# Patient Record
Sex: MALE | Race: BLACK OR AFRICAN AMERICAN
[De-identification: names, ages, dates, MRNs, and addresses within clinical notes are randomized per-mention and may not be internally consistent; named-entity substitution may affect disease eponyms.]

---

## 2017-04-03 ENCOUNTER — HOSPITAL ENCOUNTER (INPATIENT)
Dept: HOSPITAL 4 - SED | Age: 37
LOS: 1 days | Discharge: LEFT BEFORE DISCHARGE | DRG: 770 | End: 2017-04-04
Payer: COMMERCIAL

## 2017-04-03 VITALS
BODY MASS INDEX: 38.36 KG/M2 | HEIGHT: 76 IN | WEIGHT: 315 LBS | BODY MASS INDEX: 38.36 KG/M2 | WEIGHT: 315 LBS | HEIGHT: 76 IN

## 2017-04-03 VITALS
TEMPERATURE: 98 F | HEART RATE: 137 BPM | SYSTOLIC BLOOD PRESSURE: 136 MMHG | OXYGEN SATURATION: 97 % | RESPIRATION RATE: 18 BRPM | DIASTOLIC BLOOD PRESSURE: 96 MMHG

## 2017-04-03 VITALS
SYSTOLIC BLOOD PRESSURE: 135 MMHG | RESPIRATION RATE: 19 BRPM | OXYGEN SATURATION: 95 % | DIASTOLIC BLOOD PRESSURE: 79 MMHG | HEART RATE: 79 BPM | TEMPERATURE: 97.6 F

## 2017-04-03 DIAGNOSIS — E03.9: ICD-10-CM

## 2017-04-03 DIAGNOSIS — E11.9: ICD-10-CM

## 2017-04-03 DIAGNOSIS — I11.9: ICD-10-CM

## 2017-04-03 DIAGNOSIS — F32.9: ICD-10-CM

## 2017-04-03 DIAGNOSIS — F10.129: Primary | ICD-10-CM

## 2017-04-03 DIAGNOSIS — E66.01: ICD-10-CM

## 2017-04-03 DIAGNOSIS — G92: ICD-10-CM

## 2017-04-03 DIAGNOSIS — F41.9: ICD-10-CM

## 2017-04-03 DIAGNOSIS — Z79.899: ICD-10-CM

## 2017-04-03 DIAGNOSIS — F10.10: ICD-10-CM

## 2017-04-03 LAB
ALBUMIN SERPL BCP-MCNC: 3.7 G/DL (ref 3.4–4.8)
ALT SERPL W P-5'-P-CCNC: 70 U/L (ref 12–78)
AMPHETAMINES UR QL SCN: NEGATIVE
ANION GAP SERPL CALCULATED.3IONS-SCNC: < 3 MMOL/L (ref 5–15)
APAP SERPL-MCNC: < 1 UG/ML (ref 1–30)
APPEARANCE UR: CLEAR
AST SERPL W P-5'-P-CCNC: 71 U/L (ref 10–37)
BACTERIA URNS QL MICRO: (no result) /HPF
BARBITURATES UR QL SCN: NEGATIVE
BASOPHILS # BLD AUTO: 0 K/UL (ref 0–0.2)
BASOPHILS NFR BLD AUTO: 0.5 % (ref 0–2)
BENZODIAZ UR QL SCN: NEGATIVE
BILIRUB SERPL-MCNC: 0.4 MG/DL (ref 0–1)
BILIRUB UR QL STRIP: NEGATIVE
BUN SERPL-MCNC: 12 MG/DL (ref 8–21)
BZE UR QL SCN: NEGATIVE
CALCIUM SERPL-MCNC: 8.1 MG/DL (ref 8.4–11)
CANNABINOIDS UR QL SCN: NEGATIVE
CHLORIDE SERPL-SCNC: 102 MMOL/L (ref 98–107)
COLOR UR: YELLOW
CREAT SERPL-MCNC: 1.09 MG/DL (ref 0.55–1.3)
EOSINOPHIL # BLD AUTO: 0 K/UL (ref 0–0.4)
EOSINOPHIL NFR BLD AUTO: 0 % (ref 0–4)
ERYTHROCYTE [DISTWIDTH] IN BLOOD BY AUTOMATED COUNT: 14.2 % (ref 9–15)
ETHANOL SERPL-MCNC: 419 MG/DL (ref ?–10)
GFR SERPL CREATININE-BSD FRML MDRD: 97 ML/MIN (ref 90–?)
GLUCOSE SERPL-MCNC: 132 MG/DL (ref 70–99)
GLUCOSE UR STRIP-MCNC: NEGATIVE MG/DL
HCT VFR BLD AUTO: 40.4 % (ref 36–54)
HGB BLD-MCNC: 13.5 G/DL (ref 14–18)
HGB UR QL STRIP: (no result)
INR PPP: 1 (ref 0.8–1.2)
KETONES UR STRIP-MCNC: NEGATIVE MG/DL
LEUKOCYTE ESTERASE UR QL STRIP: NEGATIVE
LYMPHOCYTES # BLD AUTO: 3.6 K/UL (ref 1–5.5)
LYMPHOCYTES NFR BLD AUTO: 46.8 % (ref 20.5–51.5)
MCH RBC QN AUTO: 26 PG (ref 27–31)
MCHC RBC AUTO-ENTMCNC: 33 % (ref 32–36)
MCV RBC AUTO: 79 FL (ref 79–98)
METHADONE UR-SCNC: NEGATIVE UMOL/L
METHAMPHET UR-SCNC: NEGATIVE UMOL/L
MONOCYTES # BLD MANUAL: 0.4 K/UL (ref 0–1)
MONOCYTES # BLD MANUAL: 4.8 % (ref 1.7–9.3)
NEUTROPHILS # BLD AUTO: 3.8 K/UL (ref 1.8–7.7)
NEUTROPHILS NFR BLD AUTO: 47.9 % (ref 40–70)
NITRITE UR QL STRIP: NEGATIVE
OPIATES UR QL SCN: NEGATIVE
OXYCODONE SERPL-MCNC: NEGATIVE NG/ML
PCP UR QL SCN: NEGATIVE
PH UR STRIP: 6 [PH] (ref 5–8)
PLATELET # BLD AUTO: 226 K/UL (ref 130–430)
POTASSIUM SERPL-SCNC: 3.5 MMOL/L (ref 3.5–5.1)
PROT SERPL-MCNC: 7.8 G/DL (ref 6.4–8.3)
PROT UR QL STRIP: (no result)
PROTHROMBIN TIME: 11.3 SECS (ref 9.5–12.5)
RBC # BLD AUTO: 5.14 MIL/UL (ref 4.2–6.2)
RBC #/AREA URNS HPF: (no result) /HPF (ref 0–3)
SALICYLATES SERPL-MCNC: 1 MG/DL (ref 3–30)
SODIUM SERPLBLD-SCNC: 139 MMOL/L (ref 136–145)
SP GR UR STRIP: 1.01 (ref 1–1.03)
TRICYCLICS UR-MCNC: NEGATIVE NG/ML
URINE PROPOXYPHENE SCREEN: NEGATIVE
UROBILINOGEN UR STRIP-MCNC: 0.2 MG/DL (ref 0.2–1)
WBC # BLD AUTO: 7.8 K/UL (ref 4.8–10.8)
WBC #/AREA URNS HPF: (no result) /HPF (ref 0–3)

## 2017-04-03 PROCEDURE — C9113 INJ PANTOPRAZOLE SODIUM, VIA: HCPCS

## 2017-04-03 PROCEDURE — G0481 DRUG TEST DEF 8-14 CLASSES: HCPCS

## 2017-04-03 PROCEDURE — A6209 FOAM DRSG <=16 SQ IN W/O BDR: HCPCS

## 2017-04-03 PROCEDURE — G0480 DRUG TEST DEF 1-7 CLASSES: HCPCS

## 2017-04-03 PROCEDURE — G0482 DRUG TEST DEF 15-21 CLASSES: HCPCS

## 2017-04-03 NOTE — NUR
ADMISSION NOTE

Received patient from ER via gurney. Patient admitted with diagnosis of Alcohol 
intoxication. Patient is awake, alert, oriented but sleepy.  Patient oriented to hospital 
room, call light, toileting, pain management and safety-teach back done. Patient informed 
that German will be his nurse and that their room number is 121C. Personal belongings checked 
and Belongings List documented. Call light within reach.

## 2017-04-03 NOTE — NUR
Patient will be admitted to care of Dr Morin.  Admitted to Tele  unit.  Will go 
to room 121C.  Belongings list completed.  Summary report printed. Report given 
to Admitting RN .

## 2017-04-03 NOTE — NUR
CONSULTATION PAGED



REASON FOR CONSULTATION:ELEVATED TROPONIN

WAS CONSULT CALLED?Y

PERSON WHO WAS NOTIFIED:GUS

CONSULTING PHYSICIAN:RAYNE SAAB

CONSULTANT SPECIALTY:CARDIO

CONSULTANT PHONE NUMBER:[phone]

## 2017-04-03 NOTE — NUR
Initial note

Received 37 y.o.  male from ER via [employer] for ALOC.  Awake, alert & oriented 
to name, place and year.  Denies pain or discomfort.  No respiratory distress noted.  able 
to ambulate with steady gait.   Banana bag infusing to left ac @ 250 cc/hr.  Instructed on 
plan of care & use of call light if in need of assistance.  Verbalized understanding.  Call 
light within reach, bed alarm on.

## 2017-04-03 NOTE — NUR
Arrived via BLS ambulance having consumed 2 4 locos drink and a bottle of 
voodka. Pt is poorly verbal and has blood around his mouth with a abraison to 
tongue. Patient to ER boucher 1 to [facility] for evaluation. Side rails up. Report 
given to Efe GIRALDO.

## 2017-04-04 VITALS
SYSTOLIC BLOOD PRESSURE: 132 MMHG | OXYGEN SATURATION: 96 % | RESPIRATION RATE: 20 BRPM | DIASTOLIC BLOOD PRESSURE: 91 MMHG | HEART RATE: 84 BPM | TEMPERATURE: 98.4 F

## 2017-04-04 VITALS
TEMPERATURE: 97.8 F | RESPIRATION RATE: 17 BRPM | SYSTOLIC BLOOD PRESSURE: 150 MMHG | OXYGEN SATURATION: 97 % | DIASTOLIC BLOOD PRESSURE: 96 MMHG | HEART RATE: 80 BPM

## 2017-04-04 VITALS
HEART RATE: 77 BPM | SYSTOLIC BLOOD PRESSURE: 154 MMHG | TEMPERATURE: 97.8 F | DIASTOLIC BLOOD PRESSURE: 95 MMHG | RESPIRATION RATE: 20 BRPM | OXYGEN SATURATION: 96 %

## 2017-04-04 VITALS — HEART RATE: 84 BPM | SYSTOLIC BLOOD PRESSURE: 132 MMHG | DIASTOLIC BLOOD PRESSURE: 91 MMHG

## 2017-04-04 RX ADMIN — ALBUTEROL SULFATE SCH MG: 2.5 SOLUTION RESPIRATORY (INHALATION) at 07:59

## 2017-04-04 RX ADMIN — ALBUTEROL SULFATE SCH MG: 2.5 SOLUTION RESPIRATORY (INHALATION) at 11:46

## 2017-04-04 NOTE — NUR
NOTE:

PATIENT IS RESTING IN CHAIR. NO S/S OF DISTRESS OR SOB. PATIENT STATED THAT HE NEEDED TO 
LEAVE FOR WORK. PAGED DR. RODAS AND INFORMED AND HE STATED THAT HE WOULD NOT DISCHARGE BUT 
PATIENT COULD LEAVE AMA IF WANTED. INFORMED PATIENT AND HE STATED THAT HE WOULD LEAVE AMA. 
PAPER WORK WAS SIGNED AND PATIENT IS WAITING FOR CLOTHES FROM SECURITY.

## 2017-04-04 NOTE — NUR
CANCELATION OF CONSULT COULD NOT BE REACHED.



TIRED TO CALL BACK DR. BARNES OFFICE TO CANCEL CONSULTATION. THERE WAS NO ANSWER. RN MAYDA 
AND CHARGE NURSE ALEXANDRU WAS NOTIFIED AND AWARE.

## 2017-04-04 NOTE — NUR
Closing note

Resting with eyes closed, easily aroused.  no agitation noted.  No c/o pain or discomfort.  
IV fluid infusing to left ac without difficulty.  All needs attended to.  Call light within 
reach, fall precautions in place.  Will give report to KRISTAL Boo via SBAR method.

## 2017-04-04 NOTE — NUR
CONSULTATION PAGED



REASON FOR CONSULTATION:ETOH ABUSE

WAS CONSULT CALLED?Y

PERSON WHO WAS NOTIFIED:LEONARDO

CONSULTING PHYSICIAN:,SAID

CONSULTANT SPECIALTY:PSYCH

CONSULTANT PHONE NUMBER:[phone]

## 2017-04-04 NOTE — NUR
Ambulation

Sitting up on side of bed.  Stated wanted to go to toilet.  Instructed to notify staff & use 
of call light if need to get up to assist.  Verbalized understanding.  Ambulated to toilet 
with steady gait, denies dizziness.  Ambulated back to bed with supervision.  Call light 
placed within reach, bed alarm on.

## 2017-04-04 NOTE — NUR
NOTE:

PATIENT IS RESTING COMFORTABLY IN BED. NO S/S OF DISTRESS OR SOB. PATIENT IS ALERT AND 
ORIENTED, ABLE TO EXPRESS NEEDS, AND ASK FOR ASSISTANCE. CALL LIGHT IN REACH, BED IN LOWEST 
POSITION, AND WILL CONTINUE TO MONITOR.

## 2017-04-04 NOTE — NUR
Opening Note:

Received report from night nurse. Patient is sleeping in bed. No s/s of distress or sob. 
Patient is alert and oriented. IV is patent and infusing. Call light in reach, bed in lowest 
position, and will continue to monitor.

## 2017-04-04 NOTE — NUR
CANCELATION OF CONSULT COULD NOT BE REACHED. 



TRIED TO CALL BACK DR. BARNES OFFICE TO CANCEL CONSULTATION. NO ONE PICKED UP. CALLED FOUR 
TIMES TO GET A HOLD OF 'S OFFICE. NO ANSWER. KRISTAL OHARA AND CHARGE NURSE ALEXANDRU WAS NOTIFIED 
AND AWARE. 

-------------------------------------------------------------------------------

Addendum: 04/04/17 at 1618 by Kayla Damico CNA

-------------------------------------------------------------------------------

CANCELATION OF CONSULT COULD NOT BE REACHED.



TIRED TO CALL BACK DR. BARNES OFFICE TO CANCEL CONSULTATION. THERE WAS NO ANSWER. KRISTAL OHARA 
AND CHARGE NURSE HORVATH WAS NOTIFIED AND AWARE.

## 2017-04-04 NOTE — NUR
AMA: 

Patient does not wish to proceed with medical care recommended by Dr. Mckeon.  Patient 
given information related to possible complications, up to and including death, which could 
occur as a result of leaving hospital at this time.  Patient verbalizes understanding of 
risks involved leaving against medical advice.  Patient has signed AMA form.

## 2017-04-04 NOTE — NUR
NOTE:

PATIENT IS RESTING COMFORTABLY IN BED. NO S/S OF DISTRESS OR SOB. PATIENT IS ALERT AND 
ORIENTED, ABLE TO EXPRESS NEEDS, AND ASK FOR ASSISTANCE. DR. RODAS IN TO SEE PATIENT. CALL 
LIGHT IN REACH, BED IN LOWEST POSITION, AND WILL CONTINUE TO MONITOR.

## 2017-07-17 ENCOUNTER — HOSPITAL ENCOUNTER (INPATIENT)
Dept: HOSPITAL 4 - SED | Age: 37
LOS: 4 days | Discharge: HOME | End: 2017-07-21
Attending: INTERNAL MEDICINE | Admitting: INTERNAL MEDICINE
Payer: MEDICAID

## 2017-07-17 VITALS — SYSTOLIC BLOOD PRESSURE: 145 MMHG

## 2017-07-17 VITALS — BODY MASS INDEX: 39.17 KG/M2 | HEIGHT: 75 IN | WEIGHT: 315 LBS

## 2017-07-17 DIAGNOSIS — Z79.899: ICD-10-CM

## 2017-07-17 DIAGNOSIS — Z82.49: ICD-10-CM

## 2017-07-17 DIAGNOSIS — F10.29: Primary | ICD-10-CM
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
## 2017-07-17 DIAGNOSIS — J44.9: ICD-10-CM

## 2017-07-17 DIAGNOSIS — F39: ICD-10-CM

## 2017-07-17 DIAGNOSIS — E03.9: ICD-10-CM

## 2017-07-17 DIAGNOSIS — F32.9: ICD-10-CM

## 2017-07-17 DIAGNOSIS — R45.851: ICD-10-CM

## 2017-07-17 DIAGNOSIS — Z83.3: ICD-10-CM

## 2017-07-17 DIAGNOSIS — I10: ICD-10-CM

## 2017-07-17 LAB
ALBUMIN SERPL BCP-MCNC: 3.3 G/DL (ref 3.4–4.8)
ALT SERPL W P-5'-P-CCNC: 134 U/L (ref 12–78)
AMPHETAMINES UR QL SCN: NEGATIVE
ANION GAP SERPL CALCULATED.3IONS-SCNC: 7 MMOL/L (ref 5–15)
APAP SERPL-MCNC: < 1 UG/ML (ref 1–30)
APPEARANCE UR: (no result)
AST SERPL W P-5'-P-CCNC: 104 U/L (ref 10–37)
BACTERIA URNS QL MICRO: (no result) /HPF
BARBITURATES UR QL SCN: NEGATIVE
BASOPHILS NFR BLD MANUAL: 0 % (ref 0–2)
BENZODIAZ UR QL SCN: POSITIVE
BILIRUB SERPL-MCNC: 0.4 MG/DL (ref 0–1)
BILIRUB UR QL STRIP: NEGATIVE
BUN SERPL-MCNC: 17 MG/DL (ref 8–21)
BZE UR QL SCN: NEGATIVE
CALCIUM SERPL-MCNC: 7.5 MG/DL (ref 8.4–11)
CANNABINOIDS UR QL SCN: NEGATIVE
CHLORIDE SERPL-SCNC: 106 MMOL/L (ref 98–107)
COLOR UR: YELLOW
CREAT SERPL-MCNC: 0.94 MG/DL (ref 0.55–1.3)
EOSINOPHIL NFR BLD MANUAL: 0 % (ref 0–7)
ERYTHROCYTE [DISTWIDTH] IN BLOOD BY AUTOMATED COUNT: 13.8 % (ref 9–15)
GFR SERPL CREATININE-BSD FRML MDRD: 115 ML/MIN (ref 90–?)
GLUCOSE SERPL-MCNC: 156 MG/DL (ref 70–99)
GLUCOSE UR STRIP-MCNC: NEGATIVE MG/DL
HCT VFR BLD AUTO: 39.4 % (ref 36–54)
HGB BLD-MCNC: 13.1 G/DL (ref 14–18)
HGB UR QL STRIP: NEGATIVE
KETONES UR STRIP-MCNC: NEGATIVE MG/DL
LEUKOCYTE ESTERASE UR QL STRIP: NEGATIVE
LYMPHOCYTES NFR BLD MANUAL: 52 % (ref 20–46)
MCH RBC QN AUTO: 27 PG (ref 27–31)
MCHC RBC AUTO-ENTMCNC: 33 % (ref 32–36)
MCV RBC AUTO: 80 FL (ref 79–98)
METHADONE UR-SCNC: NEGATIVE UMOL/L
METHAMPHET UR-SCNC: NEGATIVE UMOL/L
MONOCYTES # BLD MANUAL: 4 % (ref 0–11)
MUCOUS THREADS URNS QL MICRO: (no result) /LPF
NEUTS BAND NFR BLD MANUAL: 0 % (ref 0–6)
NITRITE UR QL STRIP: NEGATIVE
OPIATES UR QL SCN: NEGATIVE
OXYCODONE SERPL-MCNC: NEGATIVE NG/ML
PCP UR QL SCN: NEGATIVE
PH UR STRIP: 6 [PH] (ref 5–8)
PLATELET # BLD AUTO: 201 K/UL (ref 130–430)
POTASSIUM SERPL-SCNC: 3 MMOL/L (ref 3.5–5.1)
PROT SERPL-MCNC: 7.1 G/DL (ref 6.4–8.3)
PROT UR QL STRIP: (no result)
RBC # BLD AUTO: 4.93 MIL/UL (ref 4.2–6.2)
RBC #/AREA URNS HPF: (no result) /HPF (ref 0–3)
SALICYLATES SERPL-MCNC: 1 MG/DL (ref 3–30)
SODIUM SERPLBLD-SCNC: 144 MMOL/L (ref 136–145)
SP GR UR STRIP: 1.01 (ref 1–1.03)
TRICYCLICS UR-MCNC: NEGATIVE NG/ML
URINE PROPOXYPHENE SCREEN: NEGATIVE
UROBILINOGEN UR STRIP-MCNC: 1 MG/DL (ref 0.2–1)
VARIANT LYMPHS NFR BLD MANUAL: 0 % (ref 0–0)
WBC # BLD AUTO: 8 K/UL (ref 4.8–10.8)
WBC #/AREA URNS HPF: (no result) /HPF (ref 0–3)

## 2017-07-17 PROCEDURE — G0482 DRUG TEST DEF 15-21 CLASSES: HCPCS

## 2017-07-17 PROCEDURE — G0480 DRUG TEST DEF 1-7 CLASSES: HCPCS

## 2017-07-17 PROCEDURE — G0481 DRUG TEST DEF 8-14 CLASSES: HCPCS

## 2017-07-18 VITALS — SYSTOLIC BLOOD PRESSURE: 134 MMHG

## 2017-07-18 VITALS — SYSTOLIC BLOOD PRESSURE: 150 MMHG

## 2017-07-18 LAB
ANION GAP SERPL CALCULATED.3IONS-SCNC: < 3 MMOL/L (ref 5–15)
BASOPHILS # BLD AUTO: 0.1 K/UL (ref 0–0.2)
BASOPHILS NFR BLD AUTO: 0.9 % (ref 0–2)
BUN SERPL-MCNC: 21 MG/DL (ref 8–21)
CALCIUM SERPL-MCNC: 7.4 MG/DL (ref 8.4–11)
CHLORIDE SERPL-SCNC: 107 MMOL/L (ref 98–107)
CREAT SERPL-MCNC: 0.95 MG/DL (ref 0.55–1.3)
EOSINOPHIL # BLD AUTO: 0 K/UL (ref 0–0.4)
EOSINOPHIL NFR BLD AUTO: 0.4 % (ref 0–4)
ERYTHROCYTE [DISTWIDTH] IN BLOOD BY AUTOMATED COUNT: 13.7 % (ref 9–15)
GFR SERPL CREATININE-BSD FRML MDRD: 114 ML/MIN (ref 90–?)
GLUCOSE SERPL-MCNC: 99 MG/DL (ref 70–99)
HCT VFR BLD AUTO: 36 % (ref 36–54)
HGB BLD-MCNC: 11.8 G/DL (ref 14–18)
LYMPHOCYTES # BLD AUTO: 3.7 K/UL (ref 1–5.5)
LYMPHOCYTES NFR BLD AUTO: 42.3 % (ref 20.5–51.5)
MCH RBC QN AUTO: 26 PG (ref 27–31)
MCHC RBC AUTO-ENTMCNC: 33 % (ref 32–36)
MCV RBC AUTO: 79 FL (ref 79–98)
MONOCYTES # BLD MANUAL: 0.5 K/UL (ref 0–1)
MONOCYTES # BLD MANUAL: 6 % (ref 1.7–9.3)
NEUTROPHILS # BLD AUTO: 4.4 K/UL (ref 1.8–7.7)
NEUTROPHILS NFR BLD AUTO: 50.4 % (ref 40–70)
PLATELET # BLD AUTO: 183 K/UL (ref 130–430)
POTASSIUM SERPL-SCNC: 3.9 MMOL/L (ref 3.5–5.1)
RBC # BLD AUTO: 4.54 MIL/UL (ref 4.2–6.2)
SODIUM SERPLBLD-SCNC: 142 MMOL/L (ref 136–145)
WBC # BLD AUTO: 8.7 K/UL (ref 4.8–10.8)

## 2017-07-18 RX ADMIN — ZOLPIDEM TARTRATE PRN MG: 5 TABLET, FILM COATED ORAL at 22:28

## 2017-07-19 VITALS — SYSTOLIC BLOOD PRESSURE: 143 MMHG

## 2017-07-19 VITALS — SYSTOLIC BLOOD PRESSURE: 148 MMHG

## 2017-07-19 VITALS — SYSTOLIC BLOOD PRESSURE: 138 MMHG

## 2017-07-19 VITALS — SYSTOLIC BLOOD PRESSURE: 154 MMHG

## 2017-07-19 VITALS — SYSTOLIC BLOOD PRESSURE: 150 MMHG

## 2017-07-19 VITALS — SYSTOLIC BLOOD PRESSURE: 142 MMHG

## 2017-07-19 VITALS — SYSTOLIC BLOOD PRESSURE: 146 MMHG

## 2017-07-19 VITALS — SYSTOLIC BLOOD PRESSURE: 167 MMHG

## 2017-07-19 VITALS — SYSTOLIC BLOOD PRESSURE: 151 MMHG

## 2017-07-19 VITALS — SYSTOLIC BLOOD PRESSURE: 111 MMHG

## 2017-07-19 VITALS — SYSTOLIC BLOOD PRESSURE: 149 MMHG

## 2017-07-19 VITALS — SYSTOLIC BLOOD PRESSURE: 144 MMHG

## 2017-07-19 VITALS — SYSTOLIC BLOOD PRESSURE: 152 MMHG

## 2017-07-19 VITALS — SYSTOLIC BLOOD PRESSURE: 147 MMHG

## 2017-07-19 VITALS — SYSTOLIC BLOOD PRESSURE: 145 MMHG

## 2017-07-19 VITALS — SYSTOLIC BLOOD PRESSURE: 132 MMHG

## 2017-07-19 LAB
ALBUMIN SERPL BCP-MCNC: 2.8 G/DL (ref 3.4–4.8)
ALT SERPL W P-5'-P-CCNC: 107 U/L (ref 12–78)
ANION GAP SERPL CALCULATED.3IONS-SCNC: 5 MMOL/L (ref 5–15)
AST SERPL W P-5'-P-CCNC: 84 U/L (ref 10–37)
BILIRUB SERPL-MCNC: 0.7 MG/DL (ref 0–1)
BUN SERPL-MCNC: 21 MG/DL (ref 8–21)
CALCIUM SERPL-MCNC: 7.6 MG/DL (ref 8.4–11)
CHLORIDE SERPL-SCNC: 105 MMOL/L (ref 98–107)
CREAT SERPL-MCNC: 0.91 MG/DL (ref 0.55–1.3)
GFR SERPL CREATININE-BSD FRML MDRD: 119 ML/MIN (ref 90–?)
GLUCOSE SERPL-MCNC: 95 MG/DL (ref 70–99)
POTASSIUM SERPL-SCNC: 3.7 MMOL/L (ref 3.5–5.1)
PROT SERPL-MCNC: 6.5 G/DL (ref 6.4–8.3)
SODIUM SERPLBLD-SCNC: 140 MMOL/L (ref 136–145)
TSH SERPL DL<=0.05 MIU/L-ACNC: 3.39 UIU/ML (ref 0.34–4.82)

## 2017-07-19 RX ADMIN — GABAPENTIN SCH MG: 300 CAPSULE ORAL at 20:50

## 2017-07-19 RX ADMIN — GABAPENTIN SCH MG: 300 CAPSULE ORAL at 08:50

## 2017-07-19 RX ADMIN — OXCARBAZEPINE SCH MG: 150 TABLET, FILM COATED ORAL at 08:50

## 2017-07-19 RX ADMIN — ONDANSETRON PRN MG: 2 INJECTION INTRAMUSCULAR; INTRAVENOUS at 03:57

## 2017-07-19 RX ADMIN — OXCARBAZEPINE SCH MG: 150 TABLET, FILM COATED ORAL at 20:50

## 2017-07-19 RX ADMIN — Medication SCH MG: at 08:51

## 2017-07-19 RX ADMIN — ONDANSETRON PRN MG: 2 INJECTION INTRAMUSCULAR; INTRAVENOUS at 12:15

## 2017-07-19 RX ADMIN — PANTOPRAZOLE SODIUM SCH MG: 40 TABLET, DELAYED RELEASE ORAL at 08:50

## 2017-07-19 RX ADMIN — BENAZEPRIL HYDROCHLORIDE SCH MG: 20 TABLET ORAL at 08:51

## 2017-07-19 RX ADMIN — LEVOTHYROXINE SODIUM SCH MG: 50 TABLET ORAL at 08:50

## 2017-07-19 RX ADMIN — GABAPENTIN SCH MG: 300 CAPSULE ORAL at 15:25

## 2017-07-19 RX ADMIN — SERTRALINE HYDROCHLORIDE SCH MG: 50 TABLET, FILM COATED ORAL at 08:50

## 2017-07-19 RX ADMIN — METOPROLOL SUCCINATE SCH MG: 50 TABLET, EXTENDED RELEASE ORAL at 08:49

## 2017-07-19 RX ADMIN — SERTRALINE HYDROCHLORIDE SCH MG: 50 TABLET, FILM COATED ORAL at 20:50

## 2017-07-20 VITALS — SYSTOLIC BLOOD PRESSURE: 140 MMHG

## 2017-07-20 VITALS — SYSTOLIC BLOOD PRESSURE: 138 MMHG

## 2017-07-20 VITALS — SYSTOLIC BLOOD PRESSURE: 145 MMHG

## 2017-07-20 VITALS — SYSTOLIC BLOOD PRESSURE: 146 MMHG

## 2017-07-20 VITALS — SYSTOLIC BLOOD PRESSURE: 143 MMHG

## 2017-07-20 VITALS — SYSTOLIC BLOOD PRESSURE: 127 MMHG

## 2017-07-20 VITALS — SYSTOLIC BLOOD PRESSURE: 130 MMHG

## 2017-07-20 VITALS — SYSTOLIC BLOOD PRESSURE: 148 MMHG

## 2017-07-20 VITALS — SYSTOLIC BLOOD PRESSURE: 147 MMHG

## 2017-07-20 VITALS — SYSTOLIC BLOOD PRESSURE: 144 MMHG

## 2017-07-20 VITALS — SYSTOLIC BLOOD PRESSURE: 149 MMHG

## 2017-07-20 VITALS — SYSTOLIC BLOOD PRESSURE: 136 MMHG

## 2017-07-20 VITALS — SYSTOLIC BLOOD PRESSURE: 162 MMHG

## 2017-07-20 LAB
ANION GAP SERPL CALCULATED.3IONS-SCNC: 3 MMOL/L (ref 5–15)
BUN SERPL-MCNC: 13 MG/DL (ref 8–21)
CALCIUM SERPL-MCNC: 8 MG/DL (ref 8.4–11)
CHLORIDE SERPL-SCNC: 106 MMOL/L (ref 98–107)
CREAT SERPL-MCNC: 0.83 MG/DL (ref 0.55–1.3)
GFR SERPL CREATININE-BSD FRML MDRD: 133 ML/MIN (ref 90–?)
GLUCOSE SERPL-MCNC: 101 MG/DL (ref 70–99)
POTASSIUM SERPL-SCNC: 3.5 MMOL/L (ref 3.5–5.1)
SODIUM SERPLBLD-SCNC: 139 MMOL/L (ref 136–145)

## 2017-07-20 RX ADMIN — OXCARBAZEPINE ONE MG: 150 TABLET, FILM COATED ORAL at 14:11

## 2017-07-20 RX ADMIN — BENAZEPRIL HYDROCHLORIDE SCH MG: 20 TABLET ORAL at 09:02

## 2017-07-20 RX ADMIN — OXCARBAZEPINE SCH MG: 150 TABLET, FILM COATED ORAL at 11:08

## 2017-07-20 RX ADMIN — SERTRALINE HYDROCHLORIDE ONE MG: 50 TABLET, FILM COATED ORAL at 14:11

## 2017-07-20 RX ADMIN — LEVOTHYROXINE SODIUM SCH MG: 50 TABLET ORAL at 09:13

## 2017-07-20 RX ADMIN — ONDANSETRON PRN MG: 2 INJECTION INTRAMUSCULAR; INTRAVENOUS at 09:02

## 2017-07-20 RX ADMIN — Medication SCH MG: at 09:03

## 2017-07-20 RX ADMIN — PANTOPRAZOLE SODIUM SCH MG: 40 TABLET, DELAYED RELEASE ORAL at 09:02

## 2017-07-20 RX ADMIN — GABAPENTIN SCH MG: 300 CAPSULE ORAL at 14:11

## 2017-07-20 RX ADMIN — OXCARBAZEPINE SCH MG: 150 TABLET, FILM COATED ORAL at 11:10

## 2017-07-20 RX ADMIN — OXCARBAZEPINE ONE MG: 150 TABLET, FILM COATED ORAL at 11:04

## 2017-07-20 RX ADMIN — SERTRALINE HYDROCHLORIDE SCH MG: 50 TABLET, FILM COATED ORAL at 21:18

## 2017-07-20 RX ADMIN — SERTRALINE HYDROCHLORIDE ONE MG: 50 TABLET, FILM COATED ORAL at 11:04

## 2017-07-20 RX ADMIN — GABAPENTIN SCH MG: 300 CAPSULE ORAL at 21:18

## 2017-07-20 RX ADMIN — OXCARBAZEPINE SCH MG: 150 TABLET, FILM COATED ORAL at 21:18

## 2017-07-20 RX ADMIN — GABAPENTIN SCH MG: 300 CAPSULE ORAL at 09:04

## 2017-07-20 RX ADMIN — METOPROLOL SUCCINATE SCH MG: 50 TABLET, EXTENDED RELEASE ORAL at 09:03

## 2017-07-20 RX ADMIN — SERTRALINE HYDROCHLORIDE SCH MG: 50 TABLET, FILM COATED ORAL at 11:08

## 2017-07-20 RX ADMIN — ZOLPIDEM TARTRATE PRN MG: 5 TABLET, FILM COATED ORAL at 23:16

## 2017-07-21 VITALS — SYSTOLIC BLOOD PRESSURE: 144 MMHG

## 2017-07-21 VITALS — SYSTOLIC BLOOD PRESSURE: 149 MMHG

## 2017-07-21 VITALS — SYSTOLIC BLOOD PRESSURE: 128 MMHG

## 2017-07-21 VITALS — SYSTOLIC BLOOD PRESSURE: 146 MMHG

## 2017-07-21 VITALS — SYSTOLIC BLOOD PRESSURE: 145 MMHG

## 2017-07-21 VITALS — SYSTOLIC BLOOD PRESSURE: 139 MMHG

## 2017-07-21 VITALS — SYSTOLIC BLOOD PRESSURE: 147 MMHG

## 2017-07-21 VITALS — SYSTOLIC BLOOD PRESSURE: 137 MMHG

## 2017-07-21 VITALS — SYSTOLIC BLOOD PRESSURE: 120 MMHG

## 2017-07-21 VITALS — SYSTOLIC BLOOD PRESSURE: 159 MMHG

## 2017-07-21 RX ADMIN — OXCARBAZEPINE SCH MG: 150 TABLET, FILM COATED ORAL at 08:34

## 2017-07-21 RX ADMIN — Medication SCH MG: at 08:31

## 2017-07-21 RX ADMIN — SERTRALINE HYDROCHLORIDE SCH MG: 50 TABLET, FILM COATED ORAL at 08:33

## 2017-07-21 RX ADMIN — Medication SCH MG: at 08:33

## 2017-07-21 RX ADMIN — METOPROLOL SUCCINATE SCH MG: 50 TABLET, EXTENDED RELEASE ORAL at 08:32

## 2017-07-21 RX ADMIN — BENAZEPRIL HYDROCHLORIDE SCH MG: 20 TABLET ORAL at 08:32

## 2017-07-21 RX ADMIN — LEVOTHYROXINE SODIUM SCH MG: 50 TABLET ORAL at 08:33

## 2017-07-21 RX ADMIN — GABAPENTIN SCH MG: 300 CAPSULE ORAL at 08:33

## 2017-07-21 RX ADMIN — PANTOPRAZOLE SODIUM SCH MG: 40 TABLET, DELAYED RELEASE ORAL at 08:33

## 2017-10-12 ENCOUNTER — HOSPITAL ENCOUNTER (INPATIENT)
Dept: HOSPITAL 4 - SED | Age: 37
LOS: 2 days | Discharge: HOME | End: 2017-10-14
Payer: COMMERCIAL

## 2017-10-12 VITALS — BODY MASS INDEX: 38.36 KG/M2 | WEIGHT: 315 LBS | HEIGHT: 76 IN

## 2017-10-12 VITALS — SYSTOLIC BLOOD PRESSURE: 110 MMHG

## 2017-10-12 VITALS — SYSTOLIC BLOOD PRESSURE: 130 MMHG

## 2017-10-12 VITALS — SYSTOLIC BLOOD PRESSURE: 126 MMHG

## 2017-10-12 DIAGNOSIS — I42.2: ICD-10-CM

## 2017-10-12 DIAGNOSIS — Z79.899: ICD-10-CM

## 2017-10-12 DIAGNOSIS — G62.9: ICD-10-CM

## 2017-10-12 DIAGNOSIS — G92: ICD-10-CM

## 2017-10-12 DIAGNOSIS — G43.909: ICD-10-CM

## 2017-10-12 DIAGNOSIS — F10.229: Primary | ICD-10-CM

## 2017-10-12 DIAGNOSIS — I10: ICD-10-CM

## 2017-10-12 DIAGNOSIS — N17.0: ICD-10-CM

## 2017-10-12 LAB
ALBUMIN SERPL BCP-MCNC: 3.7 G/DL (ref 3.4–4.8)
ALT SERPL W P-5'-P-CCNC: 56 U/L (ref 12–78)
AMPHETAMINES UR QL SCN: NEGATIVE
AMYLASE SERPL-CCNC: 65 U/L (ref 0–100)
ANION GAP SERPL CALCULATED.3IONS-SCNC: 10 MMOL/L (ref 5–15)
APAP SERPL-MCNC: < 1 UG/ML (ref 1–30)
APPEARANCE UR: CLEAR
AST SERPL W P-5'-P-CCNC: 49 U/L (ref 10–37)
BACTERIA URNS QL MICRO: (no result) /HPF
BARBITURATES UR QL SCN: NEGATIVE
BASOPHILS # BLD AUTO: 0 K/UL (ref 0–0.2)
BASOPHILS NFR BLD AUTO: 0.4 % (ref 0–2)
BENZODIAZ UR QL SCN: POSITIVE
BILIRUB SERPL-MCNC: 0.3 MG/DL (ref 0–1)
BILIRUB UR QL STRIP: NEGATIVE
BUN SERPL-MCNC: 14 MG/DL (ref 8–21)
BZE UR QL SCN: NEGATIVE
CALCIUM SERPL-MCNC: 8.6 MG/DL (ref 8.4–11)
CANNABINOIDS UR QL SCN: NEGATIVE
CHLORIDE SERPL-SCNC: 105 MMOL/L (ref 98–107)
COLOR UR: YELLOW
CREAT SERPL-MCNC: 1.03 MG/DL (ref 0.55–1.3)
EOSINOPHIL # BLD AUTO: 0 K/UL (ref 0–0.4)
EOSINOPHIL NFR BLD AUTO: 0.1 % (ref 0–4)
ERYTHROCYTE [DISTWIDTH] IN BLOOD BY AUTOMATED COUNT: 14 % (ref 9–15)
ETHANOL SERPL-MCNC: 444 MG/DL (ref ?–10)
GFR SERPL CREATININE-BSD FRML MDRD: 103 ML/MIN (ref 90–?)
GLUCOSE SERPL-MCNC: 158 MG/DL (ref 70–99)
GLUCOSE UR STRIP-MCNC: NEGATIVE MG/DL
HCT VFR BLD AUTO: 45.5 % (ref 36–54)
HGB BLD-MCNC: 14.3 G/DL (ref 14–18)
HGB UR QL STRIP: (no result)
INR PPP: 1.1 (ref 0.8–1.2)
KETONES UR STRIP-MCNC: NEGATIVE MG/DL
LEUKOCYTE ESTERASE UR QL STRIP: NEGATIVE
LIPASE SERPL-CCNC: 59 U/L (ref 73–393)
LYMPHOCYTES # BLD AUTO: 2 K/UL (ref 1–5.5)
LYMPHOCYTES NFR BLD AUTO: 36.9 % (ref 20.5–51.5)
MCH RBC QN AUTO: 25 PG (ref 27–31)
MCHC RBC AUTO-ENTMCNC: 31 % (ref 32–36)
MCV RBC AUTO: 80 FL (ref 79–98)
METHADONE UR-SCNC: NEGATIVE UMOL/L
METHAMPHET UR-SCNC: NEGATIVE UMOL/L
MONOCYTES # BLD MANUAL: 0.2 K/UL (ref 0–1)
MONOCYTES # BLD MANUAL: 3.5 % (ref 1.7–9.3)
NEUTROPHILS # BLD AUTO: 3.3 K/UL (ref 1.8–7.7)
NEUTROPHILS NFR BLD AUTO: 59.1 % (ref 40–70)
NITRITE UR QL STRIP: NEGATIVE
OPIATES UR QL SCN: NEGATIVE
OXYCODONE SERPL-MCNC: NEGATIVE NG/ML
PCP UR QL SCN: NEGATIVE
PH UR STRIP: 6.5 [PH] (ref 5–8)
PHOSPHATE SERPL-MCNC: 3.5 MG/DL (ref 2.7–4.5)
PLATELET # BLD AUTO: 260 K/UL (ref 130–430)
POTASSIUM SERPL-SCNC: 4.1 MMOL/L (ref 3.5–5.1)
PROT UR QL STRIP: (no result)
PROTHROMBIN TIME: 11 SECS (ref 9.5–12.5)
RBC # BLD AUTO: 5.72 MIL/UL (ref 4.2–6.2)
RBC #/AREA URNS HPF: (no result) /HPF (ref 0–3)
SODIUM SERPLBLD-SCNC: 144 MMOL/L (ref 136–145)
SP GR UR STRIP: 1.01 (ref 1–1.03)
T4 FREE SERPL-MCNC: 0.7 NG/DL (ref 0.6–1.6)
TRICYCLICS UR-MCNC: NEGATIVE NG/ML
TSH SERPL DL<=0.05 MIU/L-ACNC: 0.54 UIU/ML (ref 0.34–4.82)
URINE PROPOXYPHENE SCREEN: NEGATIVE
UROBILINOGEN UR STRIP-MCNC: 0.2 MG/DL (ref 0.2–1)
WBC # BLD AUTO: 5.5 K/UL (ref 4.8–10.8)
WBC #/AREA URNS HPF: (no result) /HPF (ref 0–3)

## 2017-10-12 PROCEDURE — G0482 DRUG TEST DEF 15-21 CLASSES: HCPCS

## 2017-10-12 PROCEDURE — A6209 FOAM DRSG <=16 SQ IN W/O BDR: HCPCS

## 2017-10-12 PROCEDURE — G0481 DRUG TEST DEF 8-14 CLASSES: HCPCS

## 2017-10-12 PROCEDURE — G0480 DRUG TEST DEF 1-7 CLASSES: HCPCS

## 2017-10-12 RX ADMIN — IPRATROPIUM BROMIDE AND ALBUTEROL SULFATE SCH ML: .5; 3 SOLUTION RESPIRATORY (INHALATION) at 23:05

## 2017-10-12 RX ADMIN — ONDANSETRON PRN MG: 2 INJECTION INTRAMUSCULAR; INTRAVENOUS at 23:09

## 2017-10-12 RX ADMIN — DOCUSATE SODIUM SCH MG: 100 CAPSULE, LIQUID FILLED ORAL at 22:53

## 2017-10-12 RX ADMIN — SODIUM CHLORIDE SCH MLS/HR: 9 INJECTION, SOLUTION INTRAVENOUS at 22:53

## 2017-10-13 VITALS — SYSTOLIC BLOOD PRESSURE: 146 MMHG

## 2017-10-13 VITALS — SYSTOLIC BLOOD PRESSURE: 138 MMHG

## 2017-10-13 VITALS — SYSTOLIC BLOOD PRESSURE: 136 MMHG

## 2017-10-13 VITALS — SYSTOLIC BLOOD PRESSURE: 107 MMHG

## 2017-10-13 VITALS — SYSTOLIC BLOOD PRESSURE: 140 MMHG

## 2017-10-13 VITALS — SYSTOLIC BLOOD PRESSURE: 164 MMHG

## 2017-10-13 LAB
ANION GAP SERPL CALCULATED.3IONS-SCNC: 5 MMOL/L (ref 5–15)
BASOPHILS # BLD AUTO: 0 K/UL (ref 0–0.2)
BASOPHILS NFR BLD AUTO: 0.5 % (ref 0–2)
BUN SERPL-MCNC: 18 MG/DL (ref 8–21)
CALCIUM SERPL-MCNC: 8.4 MG/DL (ref 8.4–11)
CHLORIDE SERPL-SCNC: 105 MMOL/L (ref 98–107)
CHOLEST SERPL-MCNC: 72 MG/DL (ref ?–200)
CREAT SERPL-MCNC: 0.81 MG/DL (ref 0.55–1.3)
EOSINOPHIL # BLD AUTO: 0 K/UL (ref 0–0.4)
EOSINOPHIL NFR BLD AUTO: 0.1 % (ref 0–4)
ERYTHROCYTE [DISTWIDTH] IN BLOOD BY AUTOMATED COUNT: 14.1 % (ref 9–15)
ETHANOL SERPL-MCNC: 249 MG/DL (ref ?–10)
GFR SERPL CREATININE-BSD FRML MDRD: 136 ML/MIN (ref 90–?)
GLUCOSE SERPL-MCNC: 137 MG/DL (ref 70–99)
HCT VFR BLD AUTO: 42.6 % (ref 36–54)
HDLC SERPL-MCNC: 57 MG/DL (ref 45–?)
HGB BLD-MCNC: 13.5 G/DL (ref 14–18)
LDL CHOLESTEROL: 14 MG/DL (ref ?–100)
LYMPHOCYTES # BLD AUTO: 2.8 K/UL (ref 1–5.5)
LYMPHOCYTES NFR BLD AUTO: 37.6 % (ref 20.5–51.5)
MCH RBC QN AUTO: 25 PG (ref 27–31)
MCHC RBC AUTO-ENTMCNC: 32 % (ref 32–36)
MCV RBC AUTO: 79 FL (ref 79–98)
MONOCYTES # BLD MANUAL: 0.4 K/UL (ref 0–1)
MONOCYTES # BLD MANUAL: 5.4 % (ref 1.7–9.3)
NEUTROPHILS # BLD AUTO: 4.2 K/UL (ref 1.8–7.7)
NEUTROPHILS NFR BLD AUTO: 56.4 % (ref 40–70)
PHOSPHATE SERPL-MCNC: 4.3 MG/DL (ref 2.7–4.5)
PLATELET # BLD AUTO: 275 K/UL (ref 130–430)
POTASSIUM SERPL-SCNC: 4.3 MMOL/L (ref 3.5–5.1)
RBC # BLD AUTO: 5.41 MIL/UL (ref 4.2–6.2)
SODIUM SERPLBLD-SCNC: 141 MMOL/L (ref 136–145)
TRIGL SERPL-MCNC: 77 MG/DL (ref 30–150)
WBC # BLD AUTO: 7.4 K/UL (ref 4.8–10.8)

## 2017-10-13 RX ADMIN — ONDANSETRON PRN MG: 2 INJECTION INTRAMUSCULAR; INTRAVENOUS at 03:29

## 2017-10-13 RX ADMIN — ONDANSETRON PRN MG: 2 INJECTION INTRAMUSCULAR; INTRAVENOUS at 08:15

## 2017-10-13 RX ADMIN — SERTRALINE HYDROCHLORIDE SCH MG: 50 TABLET, FILM COATED ORAL at 21:57

## 2017-10-13 RX ADMIN — GABAPENTIN SCH MG: 100 CAPSULE ORAL at 21:57

## 2017-10-13 RX ADMIN — MULTIPLE VITAMINS W/ MINERALS TAB SCH TAB: TAB at 08:15

## 2017-10-13 RX ADMIN — IPRATROPIUM BROMIDE AND ALBUTEROL SULFATE SCH ML: .5; 3 SOLUTION RESPIRATORY (INHALATION) at 19:39

## 2017-10-13 RX ADMIN — DOCUSATE SODIUM SCH MG: 100 CAPSULE, LIQUID FILLED ORAL at 21:57

## 2017-10-13 RX ADMIN — IPRATROPIUM BROMIDE AND ALBUTEROL SULFATE SCH ML: .5; 3 SOLUTION RESPIRATORY (INHALATION) at 07:00

## 2017-10-13 RX ADMIN — GABAPENTIN SCH MG: 100 CAPSULE ORAL at 16:19

## 2017-10-13 RX ADMIN — ONDANSETRON PRN MG: 2 INJECTION INTRAMUSCULAR; INTRAVENOUS at 21:57

## 2017-10-13 RX ADMIN — Medication SCH TAB: at 08:13

## 2017-10-13 RX ADMIN — SODIUM CHLORIDE SCH MLS/HR: 9 INJECTION, SOLUTION INTRAVENOUS at 09:31

## 2017-10-13 RX ADMIN — ONDANSETRON PRN MG: 2 INJECTION INTRAMUSCULAR; INTRAVENOUS at 12:45

## 2017-10-13 RX ADMIN — DOCUSATE SODIUM SCH MG: 100 CAPSULE, LIQUID FILLED ORAL at 08:19

## 2017-10-13 RX ADMIN — SODIUM CHLORIDE SCH MLS/HR: 9 INJECTION, SOLUTION INTRAVENOUS at 17:14

## 2017-10-14 VITALS — SYSTOLIC BLOOD PRESSURE: 140 MMHG

## 2017-10-14 VITALS — SYSTOLIC BLOOD PRESSURE: 131 MMHG

## 2017-10-14 VITALS — SYSTOLIC BLOOD PRESSURE: 100 MMHG

## 2017-10-14 VITALS — SYSTOLIC BLOOD PRESSURE: 146 MMHG

## 2017-10-14 LAB
ANION GAP SERPL CALCULATED.3IONS-SCNC: 6 MMOL/L (ref 5–15)
BASOPHILS # BLD AUTO: 0 K/UL (ref 0–0.2)
BASOPHILS NFR BLD AUTO: 0.3 % (ref 0–2)
BUN SERPL-MCNC: 17 MG/DL (ref 8–21)
CALCIUM SERPL-MCNC: 8.7 MG/DL (ref 8.4–11)
CHLORIDE SERPL-SCNC: 102 MMOL/L (ref 98–107)
CREAT SERPL-MCNC: 0.87 MG/DL (ref 0.55–1.3)
EOSINOPHIL # BLD AUTO: 0 K/UL (ref 0–0.4)
EOSINOPHIL NFR BLD AUTO: 0.4 % (ref 0–4)
ERYTHROCYTE [DISTWIDTH] IN BLOOD BY AUTOMATED COUNT: 13.7 % (ref 9–15)
GFR SERPL CREATININE-BSD FRML MDRD: 126 ML/MIN (ref 90–?)
GLUCOSE SERPL-MCNC: 100 MG/DL (ref 70–99)
HBA1C MFR BLD: 6.2 % (ref 4.8–5.6)
HCT VFR BLD AUTO: 41.6 % (ref 36–54)
HGB BLD-MCNC: 13.3 G/DL (ref 14–18)
LYMPHOCYTES # BLD AUTO: 3.3 K/UL (ref 1–5.5)
LYMPHOCYTES NFR BLD AUTO: 41.9 % (ref 20.5–51.5)
MCH RBC QN AUTO: 25 PG (ref 27–31)
MCHC RBC AUTO-ENTMCNC: 32 % (ref 32–36)
MCV RBC AUTO: 79 FL (ref 79–98)
MONOCYTES # BLD MANUAL: 0.6 K/UL (ref 0–1)
MONOCYTES # BLD MANUAL: 7.3 % (ref 1.7–9.3)
NEUTROPHILS # BLD AUTO: 4 K/UL (ref 1.8–7.7)
NEUTROPHILS NFR BLD AUTO: 50.1 % (ref 40–70)
PHOSPHATE SERPL-MCNC: 4.7 MG/DL (ref 2.7–4.5)
PLATELET # BLD AUTO: 227 K/UL (ref 130–430)
POTASSIUM SERPL-SCNC: 3.7 MMOL/L (ref 3.5–5.1)
RBC # BLD AUTO: 5.28 MIL/UL (ref 4.2–6.2)
SODIUM SERPLBLD-SCNC: 137 MMOL/L (ref 136–145)
T3 UPTAKE: 27 % (ref 24–39)
T4 SERPL-MCNC: 6.3 UG/DL (ref 4.5–12)
WBC # BLD AUTO: 7.9 K/UL (ref 4.8–10.8)

## 2017-10-14 RX ADMIN — GABAPENTIN SCH MG: 100 CAPSULE ORAL at 08:09

## 2017-10-14 RX ADMIN — GABAPENTIN SCH MG: 100 CAPSULE ORAL at 14:30

## 2017-10-14 RX ADMIN — SERTRALINE HYDROCHLORIDE SCH MG: 50 TABLET, FILM COATED ORAL at 08:20

## 2017-10-14 RX ADMIN — Medication SCH TAB: at 08:09

## 2017-10-14 RX ADMIN — SODIUM CHLORIDE SCH MLS/HR: 9 INJECTION, SOLUTION INTRAVENOUS at 04:41

## 2017-10-14 RX ADMIN — DOCUSATE SODIUM SCH MG: 100 CAPSULE, LIQUID FILLED ORAL at 08:10

## 2017-10-14 RX ADMIN — MULTIPLE VITAMINS W/ MINERALS TAB SCH TAB: TAB at 08:10

## 2017-10-14 RX ADMIN — IPRATROPIUM BROMIDE AND ALBUTEROL SULFATE SCH ML: .5; 3 SOLUTION RESPIRATORY (INHALATION) at 07:26

## 2018-04-08 ENCOUNTER — HOSPITAL ENCOUNTER (EMERGENCY)
Dept: HOSPITAL 72 - EMR | Age: 38
Discharge: HOME | End: 2018-04-08
Payer: MEDICAID

## 2018-04-08 VITALS — BODY MASS INDEX: 40.43 KG/M2 | HEIGHT: 74 IN | WEIGHT: 315 LBS

## 2018-04-08 VITALS — SYSTOLIC BLOOD PRESSURE: 110 MMHG | DIASTOLIC BLOOD PRESSURE: 62 MMHG

## 2018-04-08 VITALS — SYSTOLIC BLOOD PRESSURE: 117 MMHG | DIASTOLIC BLOOD PRESSURE: 68 MMHG

## 2018-04-08 VITALS — SYSTOLIC BLOOD PRESSURE: 94 MMHG | DIASTOLIC BLOOD PRESSURE: 51 MMHG

## 2018-04-08 DIAGNOSIS — M25.572: Primary | ICD-10-CM

## 2018-04-08 DIAGNOSIS — E66.9: ICD-10-CM

## 2018-04-08 DIAGNOSIS — R60.0: ICD-10-CM

## 2018-04-08 PROCEDURE — 99283 EMERGENCY DEPT VISIT LOW MDM: CPT

## 2018-04-08 PROCEDURE — 96372 THER/PROPH/DIAG INJ SC/IM: CPT

## 2018-04-08 NOTE — EMERGENCY ROOM REPORT
History of Present Illness


General


Source:  Patient, EMS





Present Illness


HPI


38YOM BIBEMS for "my left foot hurts". Endorses broke foot recently (not sure 

when and how), had surgery 4 days ago and discharged from "[facility].


C/o pain specifically to lowerr ankle on both medial/lateral aspect by foot.


Has walking boot bedside and foot is wrapped in ACE wrap


EMS endorses patient may be intoxicated


Allergies:  


Coded Allergies:  


     No Known Allergies (Unverified , 4/8/18)





Patient History


Limited by:  medical condition


Past Medical History:  none


Past Surgical History:  other - "foot surgery"


Pertinent Family History:  none


Social History:  Denies: smoking, alcohol use, drug use


Immunizations:  UTD


Reviewed Nursing Documentation:  PMH: Agreed; PSxH: Agreed





Review of Systems


All Other Systems:  negative except mentioned in HPI





Physical Exam


Sp02 EP Interpretation:  reviewed, normal


General Appearance:  normal inspection, well appearing, no apparent distress, 

alert, GCS 15, non-toxic, obese


Head:  normocephalic, atraumatic


Eyes:  bilateral eye PERRL, bilateral eye EOMI


ENT:  normal ENT inspection, hearing grossly normal, normal pharynx, no 

angioedema, normal voice, TMs + canals normal, uvula midline, moist mucus 

membranes


Neck:  normal inspection, full range of motion, supple, thyroid normal, no 

meningismus, no bony tend


Respiratory:  normal inspection, lungs clear, normal breath sounds, no rhonchi, 

no respiratory distress, no retraction, no accessory muscle use, no wheezing, 

speaking full sentences


Cardiovascular #1:  regular rate, rhythm, no edema, no JVD, normal capillary 

refill


Gastrointestinal:  normal inspection, normal bowel sounds, non tender, soft, no 

mass, no peritonitis, non-distended, no guarding, no hernia, no pulsatile mass


Genitourinary:  no CVA tenderness


Musculoskeletal:  normal inspection, back normal, normal range of motion, no 

calf tenderness, pelvis stable, Zachary's Sign negative, other - Left ankle/foot: 

1+ non-pittign edema extending from foot to ankle. There is 1 anterior medial 

suture. posterior/lateral there is a running suture 6cm in length. Overall foot 

no erythema, no palpable warmth or crepitus, no sign of infection. No open 

wounds.


Neurologic:  normal inspection, alert, oriented x3, responsive, CNs III-XII nml 

as tested, motor strength/tone normal, cerebellar normal, normal gait, speech 

normal


Psychiatric:  normal inspection, judgement/insight normal, mood/affect normal, 

no suicidal/homicidal ideation, no delusions


Skin:  normal inspection, normal color, no rash


Lymphatic:  normal inspection, no adenopathy





Medical Decision Making


Diagnostic Impression:  


 Primary Impression:  


 Foot pain, left


ER Course


VSS, afebrile


Well appearing


Non septic appearing


No obvious signs of infection to left foot at this time - no cellulitis, no 

crepitus on exam


Xray: Hardware intact, no acute fx


No new acute trauma


Patient already has walking boot





DC home


Ortho followup as recommended by [facility]


Other X-Ray Diagnostic Results


Other X-Ray Diagnostic Results :  


   X-Ray ordered:  Foot


   # of Views/Limited Vs Complete:  2 View


   Indication:  Pain


   EP Interpretation:  Yes


   Interpretation:  no dislocation, no soft tissue swelling, other - Hardware 

intact, no acute fractures. Healing subacute/chronic fractures


   Electronically Signed by:  Dr Lisa Pereira MD


Status:  improved


Disposition:  HOME, SELF-CARE











LISA PEREIRA M.D. Apr 8, 2018 06:51

## 2018-04-08 NOTE — DIAGNOSTIC IMAGING REPORT
Indication: Pain

 

Comparison: None

 

Findings: 2 views of the left foot were obtained. 

 

No acute fractures, malalignment, erosions or periostitis are identified. There is

soft tissue swelling of the dorsum of the foot..

 

 

Impression: No acute findings

## 2018-04-14 ENCOUNTER — HOSPITAL ENCOUNTER (EMERGENCY)
Dept: HOSPITAL 72 - EMR | Age: 38
LOS: 1 days | Discharge: HOME | End: 2018-04-15
Payer: MEDICAID

## 2018-04-14 VITALS — SYSTOLIC BLOOD PRESSURE: 124 MMHG | DIASTOLIC BLOOD PRESSURE: 54 MMHG

## 2018-04-14 VITALS — BODY MASS INDEX: 40.43 KG/M2 | WEIGHT: 315 LBS | HEIGHT: 74 IN

## 2018-04-14 DIAGNOSIS — I10: ICD-10-CM

## 2018-04-14 DIAGNOSIS — F10.129: Primary | ICD-10-CM
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
## 2018-04-14 LAB
ADD MANUAL DIFF: NO
ALBUMIN SERPL-MCNC: 3.3 G/DL (ref 3.4–5)
ALBUMIN/GLOB SERPL: 0.8 {RATIO} (ref 1–2.7)
ALP SERPL-CCNC: 98 U/L (ref 46–116)
ALT SERPL-CCNC: 80 U/L (ref 12–78)
ANION GAP SERPL CALC-SCNC: 14 MMOL/L (ref 5–15)
AST SERPL-CCNC: 66 U/L (ref 15–37)
BASOPHILS NFR BLD AUTO: 1.3 % (ref 0–2)
BILIRUB SERPL-MCNC: 0.3 MG/DL (ref 0.2–1)
BUN SERPL-MCNC: 11 MG/DL (ref 7–18)
CALCIUM SERPL-MCNC: 8.5 MG/DL (ref 8.5–10.1)
CHLORIDE SERPL-SCNC: 102 MMOL/L (ref 98–107)
CO2 SERPL-SCNC: 27 MMOL/L (ref 21–32)
CREAT SERPL-MCNC: 1 MG/DL (ref 0.55–1.3)
EOSINOPHIL NFR BLD AUTO: 0.3 % (ref 0–3)
ERYTHROCYTE [DISTWIDTH] IN BLOOD BY AUTOMATED COUNT: 16.7 % (ref 11.6–14.8)
GLOBULIN SER-MCNC: 4.3 G/DL
HCT VFR BLD CALC: 36.8 % (ref 42–52)
HGB BLD-MCNC: 12.4 G/DL (ref 14.2–18)
LYMPHOCYTES NFR BLD AUTO: 46.4 % (ref 20–45)
MCV RBC AUTO: 84 FL (ref 80–99)
MONOCYTES NFR BLD AUTO: 6.5 % (ref 1–10)
NEUTROPHILS NFR BLD AUTO: 45.5 % (ref 45–75)
PLATELET # BLD: 192 K/UL (ref 150–450)
POTASSIUM SERPL-SCNC: 3.1 MMOL/L (ref 3.5–5.1)
RBC # BLD AUTO: 4.36 M/UL (ref 4.7–6.1)
SODIUM SERPL-SCNC: 143 MMOL/L (ref 136–145)
WBC # BLD AUTO: 5.5 K/UL (ref 4.8–10.8)

## 2018-04-14 PROCEDURE — 80329 ANALGESICS NON-OPIOID 1 OR 2: CPT

## 2018-04-14 PROCEDURE — 80053 COMPREHEN METABOLIC PANEL: CPT

## 2018-04-14 PROCEDURE — 99284 EMERGENCY DEPT VISIT MOD MDM: CPT

## 2018-04-14 PROCEDURE — 85025 COMPLETE CBC W/AUTO DIFF WBC: CPT

## 2018-04-14 PROCEDURE — 96374 THER/PROPH/DIAG INJ IV PUSH: CPT

## 2018-04-14 PROCEDURE — 80307 DRUG TEST PRSMV CHEM ANLYZR: CPT

## 2018-04-14 PROCEDURE — 36415 COLL VENOUS BLD VENIPUNCTURE: CPT

## 2018-04-14 PROCEDURE — 96361 HYDRATE IV INFUSION ADD-ON: CPT

## 2018-04-14 NOTE — EMERGENCY ROOM REPORT
History of Present Illness


General


Chief Complaint:  Alcohol Intoxication


Source:  Patient, EMS





Present Illness


HPI


39 yo M pw alcohol intox. admits to drinking vodka tonight. picked up on the 

street found in front of his house by his neighbors. currently aox3 but 

intoxicated. has hx of L foot fx s/p surgery few weeks ago, was seen in ED 4/8 

with similar complaints


denies any pain


Allergies:  


Coded Allergies:  


     No Known Allergies (Unverified , 4/8/18)


     UNABLE TO ASSESS (Unverified , 4/14/18)





Patient History


Past Medical History:  see triage record


Past Surgical History:  none


Pertinent Family History:  none


Reviewed Nursing Documentation:  PMH: Agreed; PSxH: Agreed





Nursing Documentation-PMH


Past Medical History Deferred:  Patient Unconscious


Hx Hypertension:  Yes





Review of Systems


All Other Systems:  negative except mentioned in HPI





Physical Exam





Vital Signs








  Date Time  Temp Pulse Resp B/P (MAP) Pulse Ox O2 Delivery O2 Flow Rate FiO2


 


4/14/18 20:55 98.2 81 15 121/71 98 Room Air  





 98.2       








Sp02 EP Interpretation:  reviewed, normal


General Appearance:  no apparent distress, alert, other - intox but cooperative


Head:  normocephalic, atraumatic


Eyes:  bilateral eye normal inspection, bilateral eye PERRL, bilateral eye EOMI


ENT:  normal ENT inspection, normal pharynx, normal voice, moist mucus membranes


Neck:  normal inspection, full range of motion, supple


Respiratory:  normal inspection, lungs clear, normal breath sounds, no 

respiratory distress, no retraction, no wheezing, speaking full sentences, 

chest symmetrical


Cardiovascular #1:  normal inspection, regular rate, rhythm, normal capillary 

refill


Cardiovascular #2:  2+ radial (R), 2+ radial (L)


Gastrointestinal:  normal inspection, non tender, soft, non-distended, no 

guarding


Genitourinary:  no CVA tenderness


Musculoskeletal:  back normal, other - L foot with somemild swelling, sutures 

in place


Neurologic:  alert, responsive, motor strength/tone normal, sensory intact, 

other - intox


Psychiatric:  other - intox


Skin:  normal inspection, normal color, no rash, warm/dry, well hydrated, 

normal turgor





Medical Decision Making


Diagnostic Impression:  


 Primary Impression:  


 Acute alcoholic intoxication


ER Course


38-year-old male with alcohol intoxication 





DDX:


Likely alcohol intoxication


No signs of trauma





Plan:


BGM, Obtain labs, alcohol level, IVF, pending sobriety 











ER course:


Patient has remained stable during ED stay.


Received fluids


now clinicalyl sober


ambulated


steady gait





Disposition:


Patient is to be discharged to home.


Patient is instructed to follow up with their primary care doctor within 5 

days. 








Please note that this Emergency Department Report was dictated using [employer] technology software, occasionally this can lead to 

erroneous entry secondary to interpretation by the dictation equipment





Laboratory Tests








Test


  4/14/18


21:20 4/14/18


21:42


 


White Blood Count


  5.5 K/UL


(4.8-10.8) 


 


 


Red Blood Count


  4.36 M/UL


(4.70-6.10)  L 


 


 


Hemoglobin


  12.4 G/DL


(14.2-18.0)  L 


 


 


Hematocrit


  36.8 %


(42.0-52.0)  L 


 


 


Mean Corpuscular Volume 84 FL (80-99)   


 


Mean Corpuscular Hemoglobin


  28.4 PG


(27.0-31.0) 


 


 


Mean Corpuscular Hemoglobin


Concent 33.6 G/DL


(32.0-36.0) 


 


 


Red Cell Distribution Width


  16.7 %


(11.6-14.8)  H 


 


 


Platelet Count


  192 K/UL


(150-450) 


 


 


Mean Platelet Volume


  6.2 FL


(6.5-10.1)  L 


 


 


Neutrophils (%) (Auto)


  45.5 %


(45.0-75.0) 


 


 


Lymphocytes (%) (Auto)


  46.4 %


(20.0-45.0)  H 


 


 


Monocytes (%) (Auto)


  6.5 %


(1.0-10.0) 


 


 


Eosinophils (%) (Auto)


  0.3 %


(0.0-3.0) 


 


 


Basophils (%) (Auto)


  1.3 %


(0.0-2.0) 


 


 


Sodium Level


  143 MMOL/L


(136-145) 


 


 


Potassium Level


  3.1 MMOL/L


(3.5-5.1)  L 


 


 


Chloride Level


  102 MMOL/L


() 


 


 


Carbon Dioxide Level


  27 MMOL/L


(21-32) 


 


 


Anion Gap


  14 mmol/L


(5-15) 


 


 


Blood Urea Nitrogen


  11 mg/dL


(7-18) 


 


 


Creatinine


  1.0 MG/DL


(0.55-1.30) 


 


 


Estimate Glomerular


Filtration Rate > 60 mL/min


(>60) 


 


 


Glucose Level


  131 MG/DL


()  H 


 


 


Calcium Level


  8.5 MG/DL


(8.5-10.1) 


 


 


Total Bilirubin


  0.3 MG/DL


(0.2-1.0) 


 


 


Aspartate Amino Transferase


(AST) 66 U/L (15-37)


H 


 


 


Alanine Aminotransferase (ALT)


  80 U/L (12-78)


H 


 


 


Alkaline Phosphatase


  98 U/L


() 


 


 


Total Protein


  7.6 G/DL


(6.4-8.2) 


 


 


Albumin


  3.3 G/DL


(3.4-5.0)  L 


 


 


Globulin 4.3 g/dL   


 


Albumin/Globulin Ratio


  0.8 (1.0-2.7)


L 


 


 


Salicylates Level


  1.1 ug/mL


(2.8-20)  L 


 


 


Acetaminophen Level


  < 2 MCG/ML


(10-30)  L 


 


 


Serum Alcohol 464 mg/dL   


 


Urine Opiates Screen


  


  Negative


(NEGATIVE)


 


Urine Barbiturates Screen


  


  Negative


(NEGATIVE)


 


Phencyclidine (PCP) Screen


  


  Negative


(NEGATIVE)


 


Urine Amphetamines Screen


  


  Negative


(NEGATIVE)


 


Urine Benzodiazepines Screen


  


  Positive


(NEGATIVE)  H


 


Urine Cocaine Screen


  


  Negative


(NEGATIVE)


 


Urine Marijuana (THC) Screen


  


  Negative


(NEGATIVE)











Last Vital Signs








  Date Time  Temp Pulse Resp B/P (MAP) Pulse Ox O2 Delivery O2 Flow Rate FiO2


 


4/14/18 20:55 98.2 81 15 121/71 98 Room Air  





 98.2       








Disposition:  HOME, SELF-CARE


Condition:  Improved


Patient Instructions:  Alcohol Intoxication, Easy-to-Read











Kadi Anguiano M.D. Apr 14, 2018 21:15

## 2018-04-15 VITALS — DIASTOLIC BLOOD PRESSURE: 73 MMHG | SYSTOLIC BLOOD PRESSURE: 124 MMHG

## 2018-04-15 VITALS — SYSTOLIC BLOOD PRESSURE: 118 MMHG | DIASTOLIC BLOOD PRESSURE: 64 MMHG

## 2018-04-15 VITALS — SYSTOLIC BLOOD PRESSURE: 132 MMHG | DIASTOLIC BLOOD PRESSURE: 84 MMHG

## 2018-04-15 VITALS — SYSTOLIC BLOOD PRESSURE: 124 MMHG | DIASTOLIC BLOOD PRESSURE: 67 MMHG

## 2018-04-15 VITALS — DIASTOLIC BLOOD PRESSURE: 62 MMHG | SYSTOLIC BLOOD PRESSURE: 139 MMHG

## 2018-04-15 VITALS — SYSTOLIC BLOOD PRESSURE: 120 MMHG | DIASTOLIC BLOOD PRESSURE: 70 MMHG

## 2018-04-15 VITALS — SYSTOLIC BLOOD PRESSURE: 134 MMHG | DIASTOLIC BLOOD PRESSURE: 84 MMHG

## 2018-04-17 NOTE — CONSULTATION
DATE OF CONSULTATION:  04/15/2018



CONSULTING PHYSICIAN:  Sheri Power M.D.



HISTORY OF PRESENT ILLNESS:  The patient is a 38-year-old homosexual male

with a history of alcohol abuse and depression, who came to the emergency

room initially for a _____ complaint and he stated that his ER doctor was

indifferent, therefore he became angry and stated he wants to end his

life.  During the evaluation, he denied having any history of suicide

attempt, stated that he has no thoughts of _____.  He stated that he would

like to be discharged home, he was not withdrawing from alcohol.  He has

been treated with benzodiazepines in the ER.  His system was positive also

for benzodiazepine in addition to alcohol.  He denied any use of

benzodiazepines.  The patient agreed to outpatient referral as well as

medications.



MENTAL STATUS EXAM:  The patient is alert and oriented x4.  Cooperative,

pleasant.  Mood was dysphoric.  Affect was full range, congruent with

mood.  Thought process is linear.  Thought content, no suicidal or

homicidal ideation.  Cognition is intact.



ASSESSMENT:

AXIS I  Alcohol dependence.  Major depressive disorder.

AXIS II  Deferred.

AXIS III  As above.

AXIS IV  Low.

AXIS V  Global assessment of functioning is 50.



PLAN:  The patient will be discharged as he is not an imminent danger to

self or others nor gravely disabled, not holdable.









  ______________________________________________

  Sheri Power M.D.





DR:  HAFSA

D:  04/17/2018 00:27

T:  04/17/2018 06:00

JOB#:  [id number]

CC:

## 2018-12-18 ENCOUNTER — HOSPITAL ENCOUNTER (EMERGENCY)
Dept: HOSPITAL 87 - ER | Age: 38
Discharge: HOME | End: 2018-12-18
Payer: MEDICAID

## 2018-12-18 VITALS — HEIGHT: 76 IN | WEIGHT: 315 LBS | BODY MASS INDEX: 38.36 KG/M2

## 2018-12-18 VITALS — SYSTOLIC BLOOD PRESSURE: 147 MMHG | DIASTOLIC BLOOD PRESSURE: 99 MMHG

## 2018-12-18 DIAGNOSIS — Z98.890: ICD-10-CM

## 2018-12-18 DIAGNOSIS — I10: Primary | ICD-10-CM

## 2018-12-18 DIAGNOSIS — E03.9: ICD-10-CM

## 2018-12-18 PROCEDURE — 99283 EMERGENCY DEPT VISIT LOW MDM: CPT

## 2019-02-16 ENCOUNTER — HOSPITAL ENCOUNTER (EMERGENCY)
Dept: HOSPITAL 87 - ER | Age: 39
LOS: 1 days | Discharge: HOME | End: 2019-02-17
Payer: COMMERCIAL

## 2019-02-16 VITALS — WEIGHT: 315 LBS | BODY MASS INDEX: 42.66 KG/M2 | HEIGHT: 72 IN

## 2019-02-16 DIAGNOSIS — Z59.0: ICD-10-CM

## 2019-02-16 DIAGNOSIS — E66.01: ICD-10-CM

## 2019-02-16 DIAGNOSIS — R45.851: Primary | ICD-10-CM

## 2019-02-16 DIAGNOSIS — F10.129: ICD-10-CM
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
## 2019-02-16 DIAGNOSIS — F32.9: ICD-10-CM

## 2019-02-16 DIAGNOSIS — I10: ICD-10-CM

## 2019-02-16 DIAGNOSIS — E03.9: ICD-10-CM

## 2019-02-16 LAB
BASOPHILS NFR BLD AUTO: 0.6 % (ref 0–2)
CHLORIDE SERPL-SCNC: 105 MEQ/L (ref 98–107)
EOSINOPHIL NFR BLD AUTO: 0.5 % (ref 0–5)
ERYTHROCYTE [DISTWIDTH] IN BLOOD BY AUTOMATED COUNT: 17.1 % (ref 11.6–14.6)
ETHANOL SERPL-MCNC: 345 MG/DL
HCT VFR BLD AUTO: 44.3 % (ref 42–52)
HGB BLD-MCNC: 14.4 G/DL (ref 14–18)
LYMPHOCYTES NFR BLD AUTO: 55.3 % (ref 20–50)
MCH RBC QN AUTO: 26.7 PG (ref 28–32)
MCV RBC AUTO: 82.4 FL (ref 80–94)
MONOCYTES NFR BLD AUTO: 2.4 % (ref 2–8)
NEUTROPHILS NFR BLD AUTO: 41.2 % (ref 40–76)
PLATELET # BLD AUTO: 209 X1000/UL (ref 130–400)
PMV BLD AUTO: 8.3 FL (ref 7.4–10.4)
RBC # BLD AUTO: 5.38 MILL/UL (ref 4.7–6.1)

## 2019-02-16 PROCEDURE — 80305 DRUG TEST PRSMV DIR OPT OBS: CPT

## 2019-02-16 PROCEDURE — 87186 SC STD MICRODIL/AGAR DIL: CPT

## 2019-02-16 PROCEDURE — 36415 COLL VENOUS BLD VENIPUNCTURE: CPT

## 2019-02-16 PROCEDURE — 99284 EMERGENCY DEPT VISIT MOD MDM: CPT

## 2019-02-16 PROCEDURE — 85025 COMPLETE CBC W/AUTO DIFF WBC: CPT

## 2019-02-16 PROCEDURE — 81003 URINALYSIS AUTO W/O SCOPE: CPT

## 2019-02-16 PROCEDURE — 80053 COMPREHEN METABOLIC PANEL: CPT

## 2019-02-16 PROCEDURE — 80307 DRUG TEST PRSMV CHEM ANLYZR: CPT

## 2019-02-16 PROCEDURE — 80329 ANALGESICS NON-OPIOID 1 OR 2: CPT

## 2019-02-16 SDOH — ECONOMIC STABILITY - HOUSING INSECURITY: HOMELESSNESS: Z59.0

## 2019-02-17 ENCOUNTER — HOSPITAL ENCOUNTER (INPATIENT)
Dept: HOSPITAL 87 - ER | Age: 39
LOS: 5 days | Discharge: HOME | DRG: 816 | End: 2019-02-22
Attending: INTERNAL MEDICINE | Admitting: INTERNAL MEDICINE
Payer: COMMERCIAL

## 2019-02-17 VITALS — DIASTOLIC BLOOD PRESSURE: 76 MMHG | SYSTOLIC BLOOD PRESSURE: 138 MMHG

## 2019-02-17 VITALS — HEIGHT: 72 IN | WEIGHT: 315 LBS | BODY MASS INDEX: 42.66 KG/M2

## 2019-02-17 DIAGNOSIS — E66.01: ICD-10-CM
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
## 2019-02-17 DIAGNOSIS — J96.02: ICD-10-CM

## 2019-02-17 DIAGNOSIS — G92: ICD-10-CM

## 2019-02-17 DIAGNOSIS — F32.9: ICD-10-CM

## 2019-02-17 DIAGNOSIS — T51.0X1A: Primary | ICD-10-CM

## 2019-02-17 DIAGNOSIS — I50.9: ICD-10-CM

## 2019-02-17 DIAGNOSIS — E87.2: ICD-10-CM

## 2019-02-17 DIAGNOSIS — E03.9: ICD-10-CM

## 2019-02-17 DIAGNOSIS — J69.0: ICD-10-CM

## 2019-02-17 DIAGNOSIS — I11.0: ICD-10-CM

## 2019-02-17 DIAGNOSIS — Z71.6: ICD-10-CM

## 2019-02-17 DIAGNOSIS — G31.2: ICD-10-CM

## 2019-02-17 DIAGNOSIS — Z79.899: ICD-10-CM

## 2019-02-17 DIAGNOSIS — Z71.3: ICD-10-CM

## 2019-02-17 LAB
AMPHETAMINES UR QL SCN: NEGATIVE
APPEARANCE UR: CLEAR
BARBITURATES UR QL SCN: NEGATIVE
BASOPHILS NFR BLD AUTO: 0.9 % (ref 0–2)
BENZODIAZ UR QL SCN: NEGATIVE
BZE UR QL SCN: NEGATIVE
CANNABINOIDS UR QL SCN: NEGATIVE
CHLORIDE SERPL-SCNC: 101 MEQ/L (ref 98–107)
CK SERPL-CCNC: 430 IU/L (ref 39–308)
COLOR UR: YELLOW
EOSINOPHIL NFR BLD AUTO: 0.2 % (ref 0–5)
ERYTHROCYTE [DISTWIDTH] IN BLOOD BY AUTOMATED COUNT: 16.9 % (ref 11.6–14.6)
ETHANOL SERPL-MCNC: 479 MG/DL
HCT VFR BLD AUTO: 43.3 % (ref 42–52)
HGB BLD-MCNC: 14.3 G/DL (ref 14–18)
HGB UR QL STRIP: NEGATIVE
INR PPP: 1.1
KETONES UR STRIP-MCNC: NEGATIVE MG/DL
LDLC SERPL DIRECT ASSAY-MCNC: 38 MG/DL (ref 5–100)
LEUKOCYTE ESTERASE UR QL STRIP: NEGATIVE
LYMPHOCYTES NFR BLD AUTO: 53.6 % (ref 20–50)
MCH RBC QN AUTO: 26.9 PG (ref 28–32)
MCV RBC AUTO: 81.3 FL (ref 80–94)
METHADONE UR QL SCN: NEGATIVE
MONOCYTES NFR BLD AUTO: 3.6 % (ref 2–8)
NEUTROPHILS NFR BLD AUTO: 41.7 % (ref 40–76)
NITRITE UR QL STRIP: NEGATIVE
OPIATES UR QL SCN: NEGATIVE
PCP UR QL SCN: NEGATIVE
PH UR STRIP: 5 [PH] (ref 4.5–8)
PLATELET # BLD AUTO: 215 X1000/UL (ref 130–400)
PMV BLD AUTO: 8 FL (ref 7.4–10.4)
PROT UR QL STRIP: (no result)
PROTHROMBIN TIME: 10.9 SEC (ref 9.1–11.1)
RBC # BLD AUTO: 5.33 MILL/UL (ref 4.7–6.1)
SP GR UR STRIP: 1.03 (ref 1–1.03)
UROBILINOGEN UR STRIP-MCNC: 0.2 E.U./DL (ref 0.2–1)

## 2019-02-17 PROCEDURE — 71045 X-RAY EXAM CHEST 1 VIEW: CPT

## 2019-02-17 PROCEDURE — 82805 BLOOD GASES W/O2 SATURATION: CPT

## 2019-02-17 PROCEDURE — 80305 DRUG TEST PRSMV DIR OPT OBS: CPT

## 2019-02-17 PROCEDURE — 80048 BASIC METABOLIC PNL TOTAL CA: CPT

## 2019-02-17 PROCEDURE — 36600 WITHDRAWAL OF ARTERIAL BLOOD: CPT

## 2019-02-17 PROCEDURE — 82962 GLUCOSE BLOOD TEST: CPT

## 2019-02-17 PROCEDURE — 80329 ANALGESICS NON-OPIOID 1 OR 2: CPT

## 2019-02-17 PROCEDURE — 96372 THER/PROPH/DIAG INJ SC/IM: CPT

## 2019-02-17 PROCEDURE — 83721 ASSAY OF BLOOD LIPOPROTEIN: CPT

## 2019-02-17 PROCEDURE — 84484 ASSAY OF TROPONIN QUANT: CPT

## 2019-02-17 PROCEDURE — 96361 HYDRATE IV INFUSION ADD-ON: CPT

## 2019-02-17 PROCEDURE — 93970 EXTREMITY STUDY: CPT

## 2019-02-17 PROCEDURE — 94003 VENT MGMT INPAT SUBQ DAY: CPT

## 2019-02-17 PROCEDURE — 84443 ASSAY THYROID STIM HORMONE: CPT

## 2019-02-17 PROCEDURE — 36556 INSERT NON-TUNNEL CV CATH: CPT

## 2019-02-17 PROCEDURE — 84478 ASSAY OF TRIGLYCERIDES: CPT

## 2019-02-17 PROCEDURE — 94640 AIRWAY INHALATION TREATMENT: CPT

## 2019-02-17 PROCEDURE — 94002 VENT MGMT INPAT INIT DAY: CPT

## 2019-02-17 PROCEDURE — 96375 TX/PRO/DX INJ NEW DRUG ADDON: CPT

## 2019-02-17 PROCEDURE — 87070 CULTURE OTHR SPECIMN AEROBIC: CPT

## 2019-02-17 PROCEDURE — 36415 COLL VENOUS BLD VENIPUNCTURE: CPT

## 2019-02-17 PROCEDURE — 99291 CRITICAL CARE FIRST HOUR: CPT

## 2019-02-17 PROCEDURE — 92610 EVALUATE SWALLOWING FUNCTION: CPT

## 2019-02-17 PROCEDURE — 82550 ASSAY OF CK (CPK): CPT

## 2019-02-17 PROCEDURE — 97163 PT EVAL HIGH COMPLEX 45 MIN: CPT

## 2019-02-17 PROCEDURE — 80307 DRUG TEST PRSMV CHEM ANLYZR: CPT

## 2019-02-17 PROCEDURE — 97166 OT EVAL MOD COMPLEX 45 MIN: CPT

## 2019-02-17 PROCEDURE — 96374 THER/PROPH/DIAG INJ IV PUSH: CPT

## 2019-02-17 PROCEDURE — 97116 GAIT TRAINING THERAPY: CPT

## 2019-02-17 PROCEDURE — 97530 THERAPEUTIC ACTIVITIES: CPT

## 2019-02-17 PROCEDURE — 83880 ASSAY OF NATRIURETIC PEPTIDE: CPT

## 2019-02-17 PROCEDURE — 70498 CT ANGIOGRAPHY NECK: CPT

## 2019-02-17 PROCEDURE — 82140 ASSAY OF AMMONIA: CPT

## 2019-02-17 PROCEDURE — 83605 ASSAY OF LACTIC ACID: CPT

## 2019-02-17 PROCEDURE — 93005 ELECTROCARDIOGRAM TRACING: CPT

## 2019-02-17 PROCEDURE — 84145 PROCALCITONIN (PCT): CPT

## 2019-02-17 PROCEDURE — 31500 INSERT EMERGENCY AIRWAY: CPT

## 2019-02-17 PROCEDURE — 97535 SELF CARE MNGMENT TRAINING: CPT

## 2019-02-17 PROCEDURE — 0BH17EZ INSERTION OF ENDOTRACHEAL AIRWAY INTO TRACHEA, VIA NATURAL OR ARTIFICIAL OPENING: ICD-10-PCS | Performed by: INTERNAL MEDICINE

## 2019-02-17 PROCEDURE — 82375 ASSAY CARBOXYHB QUANT: CPT

## 2019-02-17 PROCEDURE — 5A1945Z RESPIRATORY VENTILATION, 24-96 CONSECUTIVE HOURS: ICD-10-PCS | Performed by: INTERNAL MEDICINE

## 2019-02-17 PROCEDURE — 70496 CT ANGIOGRAPHY HEAD: CPT

## 2019-02-17 PROCEDURE — 83036 HEMOGLOBIN GLYCOSYLATED A1C: CPT

## 2019-02-18 VITALS — SYSTOLIC BLOOD PRESSURE: 148 MMHG | DIASTOLIC BLOOD PRESSURE: 89 MMHG

## 2019-02-18 VITALS — DIASTOLIC BLOOD PRESSURE: 106 MMHG | SYSTOLIC BLOOD PRESSURE: 148 MMHG

## 2019-02-18 VITALS — SYSTOLIC BLOOD PRESSURE: 136 MMHG | DIASTOLIC BLOOD PRESSURE: 89 MMHG

## 2019-02-18 VITALS — SYSTOLIC BLOOD PRESSURE: 140 MMHG | DIASTOLIC BLOOD PRESSURE: 98 MMHG

## 2019-02-18 VITALS — DIASTOLIC BLOOD PRESSURE: 88 MMHG | SYSTOLIC BLOOD PRESSURE: 142 MMHG

## 2019-02-18 VITALS — SYSTOLIC BLOOD PRESSURE: 142 MMHG | DIASTOLIC BLOOD PRESSURE: 96 MMHG

## 2019-02-18 VITALS — DIASTOLIC BLOOD PRESSURE: 76 MMHG | SYSTOLIC BLOOD PRESSURE: 140 MMHG

## 2019-02-18 VITALS — DIASTOLIC BLOOD PRESSURE: 101 MMHG | SYSTOLIC BLOOD PRESSURE: 161 MMHG

## 2019-02-18 VITALS — SYSTOLIC BLOOD PRESSURE: 134 MMHG | DIASTOLIC BLOOD PRESSURE: 83 MMHG

## 2019-02-18 VITALS — SYSTOLIC BLOOD PRESSURE: 146 MMHG | DIASTOLIC BLOOD PRESSURE: 107 MMHG

## 2019-02-18 VITALS — SYSTOLIC BLOOD PRESSURE: 137 MMHG | DIASTOLIC BLOOD PRESSURE: 94 MMHG

## 2019-02-18 VITALS — SYSTOLIC BLOOD PRESSURE: 140 MMHG | DIASTOLIC BLOOD PRESSURE: 86 MMHG

## 2019-02-18 VITALS — DIASTOLIC BLOOD PRESSURE: 104 MMHG | SYSTOLIC BLOOD PRESSURE: 135 MMHG

## 2019-02-18 LAB
AMPHETAMINES UR QL SCN: NEGATIVE
APPEARANCE UR: CLEAR
BARBITURATES UR QL SCN: NEGATIVE
BASE EXCESS BLDA CALC-SCNC: 2.8 MMOL/L (ref -2–2)
BASE EXCESS BLDA CALC-SCNC: 4.2 MMOL/L (ref -2–2)
BENZODIAZ UR QL SCN: NEGATIVE
BG VENT RATE: 14 SET
BG VENT RATE: 18 SET
BZE UR QL SCN: NEGATIVE
CANNABINOIDS UR QL SCN: NEGATIVE
COHGB MFR BLDA: 0.5 % (ref 0.5–1.5)
COHGB MFR BLDA: 0.6 % (ref 0.5–1.5)
COLOR UR: YELLOW
DO-HGB MFR BLDA: 0.9 % (ref 0–5)
DO-HGB MFR BLDA: 4.1 % (ref 0–5)
HCO3 BLDA-SCNC: 30.1 MMOL/L (ref 22–26)
HCO3 BLDA-SCNC: 31.2 MMOL/L (ref 22–26)
HGB BLDA OXIMETRY-MCNC: 13.8 G/DL (ref 12–18)
HGB BLDA OXIMETRY-MCNC: 14.6 G/DL (ref 12–18)
HGB UR QL STRIP: (no result)
INHALED O2 CONCENTRATION: 100 %
INHALED O2 CONCENTRATION: 50 %
KETONES UR STRIP-MCNC: NEGATIVE MG/DL
LEUKOCYTE ESTERASE UR QL STRIP: NEGATIVE
METHADONE UR QL SCN: NEGATIVE
METHGB MFR BLDA: 0.2 % (ref 0–1.5)
METHGB MFR BLDA: 0.3 % (ref 0–1.5)
NITRITE UR QL STRIP: NEGATIVE
OPIATES UR QL SCN: NEGATIVE
OXYHGB MFR BLDA: 95.1 % (ref 94–97)
OXYHGB MFR BLDA: 98.3 % (ref 94–97)
PCO2 TEMP ADJ BLDA: 49.8 MMHG (ref 35–45)
PCO2 TEMP ADJ BLDA: 65.8 MMHG (ref 35–45)
PCP UR QL SCN: NEGATIVE
PEEP SETTING VENT: 600 ML
PEEP SETTING VENT: 650 ML
PH TEMP ADJ BLDA: 7.29 [PH] (ref 7.35–7.45)
PH TEMP ADJ BLDA: 7.4 [PH] (ref 7.35–7.45)
PH UR STRIP: 5 [PH] (ref 4.5–8)
PO2 TEMP ADJ BLDA: 229.5 MMHG (ref 75–100)
PO2 TEMP ADJ BLDA: 89.1 MMHG (ref 75–100)
PROT UR QL STRIP: (no result)
SAO2 % BLDA: 95.9 % (ref 92–98.5)
SAO2 % BLDA: 99.1 % (ref 92–98.5)
SP GR UR STRIP: 1.04 (ref 1–1.03)
SPECIMEN DRAWN FROM PATIENT: (no result)
SPECIMEN DRAWN FROM PATIENT: (no result)
UROBILINOGEN UR STRIP-MCNC: 0.2 E.U./DL (ref 0.2–1)
VENTILATION MODE VENT: (no result)
VENTILATION MODE VENT: (no result)

## 2019-02-18 RX ADMIN — PANTOPRAZOLE SODIUM SCH MG: 40 INJECTION, POWDER, FOR SOLUTION INTRAVENOUS at 11:27

## 2019-02-18 RX ADMIN — SODIUM CHLORIDE PRN MLS/HR: 9 INJECTION, SOLUTION INTRAVENOUS at 17:44

## 2019-02-18 RX ADMIN — SODIUM CHLORIDE SCH MLS/HR: 0.9 INJECTION, SOLUTION INTRAVENOUS at 23:00

## 2019-02-18 RX ADMIN — ENOXAPARIN SODIUM SCH MG: 100 INJECTION SUBCUTANEOUS at 11:32

## 2019-02-18 RX ADMIN — SODIUM CHLORIDE SCH MLS/HR: 0.9 INJECTION, SOLUTION INTRAVENOUS at 15:36

## 2019-02-18 RX ADMIN — LORAZEPAM PRN MG: 2 INJECTION INTRAMUSCULAR; INTRAVENOUS at 12:00

## 2019-02-18 RX ADMIN — DEXTROSE MONOHYDRATE PRN MLS/HR: 50 INJECTION, SOLUTION INTRAVENOUS at 20:30

## 2019-02-18 RX ADMIN — FOLIC ACID SCH MLS/HR: 5 INJECTION, SOLUTION INTRAMUSCULAR; INTRAVENOUS; SUBCUTANEOUS at 22:54

## 2019-02-18 RX ADMIN — SODIUM CHLORIDE PRN MLS/HR: 9 INJECTION, SOLUTION INTRAVENOUS at 12:56

## 2019-02-18 RX ADMIN — METRONIDAZOLE SCH MLS/HR: 500 INJECTION, SOLUTION INTRAVENOUS at 17:44

## 2019-02-18 RX ADMIN — SODIUM CHLORIDE SCH MLS/HR: 9 INJECTION, SOLUTION INTRAVENOUS at 22:54

## 2019-02-18 RX ADMIN — ENOXAPARIN SODIUM SCH MG: 100 INJECTION SUBCUTANEOUS at 22:51

## 2019-02-18 RX ADMIN — IPRATROPIUM BROMIDE AND ALBUTEROL SULFATE SCH ML: .5; 3 SOLUTION RESPIRATORY (INHALATION) at 21:14

## 2019-02-18 RX ADMIN — IPRATROPIUM BROMIDE AND ALBUTEROL SULFATE SCH ML: .5; 3 SOLUTION RESPIRATORY (INHALATION) at 14:39

## 2019-02-18 RX ADMIN — DEXTROSE MONOHYDRATE PRN MLS/HR: 50 INJECTION, SOLUTION INTRAVENOUS at 12:00

## 2019-02-19 VITALS — SYSTOLIC BLOOD PRESSURE: 141 MMHG | DIASTOLIC BLOOD PRESSURE: 79 MMHG

## 2019-02-19 VITALS — DIASTOLIC BLOOD PRESSURE: 77 MMHG | SYSTOLIC BLOOD PRESSURE: 144 MMHG

## 2019-02-19 VITALS — SYSTOLIC BLOOD PRESSURE: 152 MMHG | DIASTOLIC BLOOD PRESSURE: 96 MMHG

## 2019-02-19 VITALS — SYSTOLIC BLOOD PRESSURE: 157 MMHG | DIASTOLIC BLOOD PRESSURE: 108 MMHG

## 2019-02-19 VITALS — DIASTOLIC BLOOD PRESSURE: 94 MMHG | SYSTOLIC BLOOD PRESSURE: 128 MMHG

## 2019-02-19 VITALS — DIASTOLIC BLOOD PRESSURE: 68 MMHG | SYSTOLIC BLOOD PRESSURE: 151 MMHG

## 2019-02-19 VITALS — SYSTOLIC BLOOD PRESSURE: 127 MMHG | DIASTOLIC BLOOD PRESSURE: 89 MMHG

## 2019-02-19 VITALS — DIASTOLIC BLOOD PRESSURE: 93 MMHG | SYSTOLIC BLOOD PRESSURE: 144 MMHG

## 2019-02-19 VITALS — DIASTOLIC BLOOD PRESSURE: 84 MMHG | SYSTOLIC BLOOD PRESSURE: 116 MMHG

## 2019-02-19 VITALS — SYSTOLIC BLOOD PRESSURE: 153 MMHG | DIASTOLIC BLOOD PRESSURE: 87 MMHG

## 2019-02-19 VITALS — DIASTOLIC BLOOD PRESSURE: 98 MMHG | SYSTOLIC BLOOD PRESSURE: 131 MMHG

## 2019-02-19 VITALS — DIASTOLIC BLOOD PRESSURE: 99 MMHG | SYSTOLIC BLOOD PRESSURE: 162 MMHG

## 2019-02-19 VITALS — DIASTOLIC BLOOD PRESSURE: 91 MMHG | SYSTOLIC BLOOD PRESSURE: 120 MMHG

## 2019-02-19 VITALS — SYSTOLIC BLOOD PRESSURE: 138 MMHG | DIASTOLIC BLOOD PRESSURE: 87 MMHG

## 2019-02-19 VITALS — DIASTOLIC BLOOD PRESSURE: 83 MMHG | SYSTOLIC BLOOD PRESSURE: 126 MMHG

## 2019-02-19 VITALS — DIASTOLIC BLOOD PRESSURE: 89 MMHG | SYSTOLIC BLOOD PRESSURE: 149 MMHG

## 2019-02-19 VITALS — DIASTOLIC BLOOD PRESSURE: 90 MMHG | SYSTOLIC BLOOD PRESSURE: 172 MMHG

## 2019-02-19 VITALS — SYSTOLIC BLOOD PRESSURE: 124 MMHG | DIASTOLIC BLOOD PRESSURE: 86 MMHG

## 2019-02-19 VITALS — DIASTOLIC BLOOD PRESSURE: 101 MMHG | SYSTOLIC BLOOD PRESSURE: 152 MMHG

## 2019-02-19 VITALS — SYSTOLIC BLOOD PRESSURE: 118 MMHG | DIASTOLIC BLOOD PRESSURE: 87 MMHG

## 2019-02-19 VITALS — SYSTOLIC BLOOD PRESSURE: 125 MMHG | DIASTOLIC BLOOD PRESSURE: 89 MMHG

## 2019-02-19 VITALS — DIASTOLIC BLOOD PRESSURE: 114 MMHG | SYSTOLIC BLOOD PRESSURE: 155 MMHG

## 2019-02-19 VITALS — SYSTOLIC BLOOD PRESSURE: 139 MMHG | DIASTOLIC BLOOD PRESSURE: 68 MMHG

## 2019-02-19 VITALS — SYSTOLIC BLOOD PRESSURE: 153 MMHG | DIASTOLIC BLOOD PRESSURE: 105 MMHG

## 2019-02-19 VITALS — DIASTOLIC BLOOD PRESSURE: 97 MMHG | SYSTOLIC BLOOD PRESSURE: 141 MMHG

## 2019-02-19 VITALS — SYSTOLIC BLOOD PRESSURE: 141 MMHG | DIASTOLIC BLOOD PRESSURE: 89 MMHG

## 2019-02-19 VITALS — SYSTOLIC BLOOD PRESSURE: 146 MMHG | DIASTOLIC BLOOD PRESSURE: 100 MMHG

## 2019-02-19 VITALS — SYSTOLIC BLOOD PRESSURE: 151 MMHG | DIASTOLIC BLOOD PRESSURE: 99 MMHG

## 2019-02-19 VITALS — SYSTOLIC BLOOD PRESSURE: 119 MMHG | DIASTOLIC BLOOD PRESSURE: 84 MMHG

## 2019-02-19 VITALS — SYSTOLIC BLOOD PRESSURE: 131 MMHG | DIASTOLIC BLOOD PRESSURE: 85 MMHG

## 2019-02-19 VITALS — SYSTOLIC BLOOD PRESSURE: 137 MMHG | DIASTOLIC BLOOD PRESSURE: 99 MMHG

## 2019-02-19 VITALS — SYSTOLIC BLOOD PRESSURE: 119 MMHG | DIASTOLIC BLOOD PRESSURE: 94 MMHG

## 2019-02-19 VITALS — DIASTOLIC BLOOD PRESSURE: 98 MMHG | SYSTOLIC BLOOD PRESSURE: 157 MMHG

## 2019-02-19 VITALS — DIASTOLIC BLOOD PRESSURE: 88 MMHG | SYSTOLIC BLOOD PRESSURE: 174 MMHG

## 2019-02-19 VITALS — SYSTOLIC BLOOD PRESSURE: 158 MMHG | DIASTOLIC BLOOD PRESSURE: 104 MMHG

## 2019-02-19 VITALS — SYSTOLIC BLOOD PRESSURE: 182 MMHG | DIASTOLIC BLOOD PRESSURE: 96 MMHG

## 2019-02-19 VITALS — DIASTOLIC BLOOD PRESSURE: 99 MMHG | SYSTOLIC BLOOD PRESSURE: 149 MMHG

## 2019-02-19 VITALS — SYSTOLIC BLOOD PRESSURE: 133 MMHG | DIASTOLIC BLOOD PRESSURE: 99 MMHG

## 2019-02-19 VITALS — DIASTOLIC BLOOD PRESSURE: 104 MMHG | SYSTOLIC BLOOD PRESSURE: 138 MMHG

## 2019-02-19 VITALS — SYSTOLIC BLOOD PRESSURE: 140 MMHG | DIASTOLIC BLOOD PRESSURE: 98 MMHG

## 2019-02-19 VITALS — DIASTOLIC BLOOD PRESSURE: 79 MMHG | SYSTOLIC BLOOD PRESSURE: 122 MMHG

## 2019-02-19 VITALS — DIASTOLIC BLOOD PRESSURE: 77 MMHG | SYSTOLIC BLOOD PRESSURE: 149 MMHG

## 2019-02-19 VITALS — DIASTOLIC BLOOD PRESSURE: 110 MMHG | SYSTOLIC BLOOD PRESSURE: 151 MMHG

## 2019-02-19 VITALS — SYSTOLIC BLOOD PRESSURE: 140 MMHG | DIASTOLIC BLOOD PRESSURE: 113 MMHG

## 2019-02-19 VITALS — DIASTOLIC BLOOD PRESSURE: 94 MMHG | SYSTOLIC BLOOD PRESSURE: 131 MMHG

## 2019-02-19 LAB
BASE EXCESS BLDA CALC-SCNC: 4.5 MMOL/L (ref -2–2)
BASOPHILS NFR BLD AUTO: 0.8 % (ref 0–2)
BG TOTAL RESPIRATORY RATE: 18 B/MIN
BG VENT RATE: 18 SET
CHLORIDE SERPL-SCNC: 105 MEQ/L (ref 98–107)
COHGB MFR BLDA: 0.3 % (ref 0.5–1.5)
DO-HGB MFR BLDA: 2 % (ref 0–5)
EOSINOPHIL NFR BLD AUTO: 1.2 % (ref 0–5)
ERYTHROCYTE [DISTWIDTH] IN BLOOD BY AUTOMATED COUNT: 16.8 % (ref 11.6–14.6)
HCO3 BLDA-SCNC: 28.8 MMOL/L (ref 22–26)
HCT VFR BLD AUTO: 39.8 % (ref 42–52)
HGB BLD-MCNC: 12.9 G/DL (ref 14–18)
HGB BLDA OXIMETRY-MCNC: 12.9 G/DL (ref 12–18)
INHALED O2 CONCENTRATION: 50 %
INTRINSIC PEEP RESPIRATORY: 5 CMH2O
LYMPHOCYTES NFR BLD AUTO: 47.4 % (ref 20–50)
MCH RBC QN AUTO: 26.6 PG (ref 28–32)
MCV RBC AUTO: 81.9 FL (ref 80–94)
METHGB MFR BLDA: 0 % (ref 0–1.5)
MONOCYTES NFR BLD AUTO: 7.1 % (ref 2–8)
NEUTROPHILS NFR BLD AUTO: 43.5 % (ref 40–76)
OXYHGB MFR BLDA: 97.7 % (ref 94–97)
PCO2 TEMP ADJ BLDA: 41.6 MMHG (ref 35–45)
PEEP SETTING VENT: 600 ML
PH TEMP ADJ BLDA: 7.46 [PH] (ref 7.35–7.45)
PLATELET # BLD AUTO: 158 X1000/UL (ref 130–400)
PMV BLD AUTO: 8.4 FL (ref 7.4–10.4)
PO2 TEMP ADJ BLDA: 123.3 MMHG (ref 75–100)
RBC # BLD AUTO: 4.86 MILL/UL (ref 4.7–6.1)
SAO2 % BLDA: 98 % (ref 92–98.5)
SPECIMEN DRAWN FROM PATIENT: (no result)
VENTILATION MODE VENT: (no result)

## 2019-02-19 PROCEDURE — 4A00X4Z MEASUREMENT OF CENTRAL NERVOUS ELECTRICAL ACTIVITY, EXTERNAL APPROACH: ICD-10-PCS | Performed by: INTERNAL MEDICINE

## 2019-02-19 RX ADMIN — PANTOPRAZOLE SODIUM SCH MG: 40 INJECTION, POWDER, FOR SOLUTION INTRAVENOUS at 08:48

## 2019-02-19 RX ADMIN — SODIUM CHLORIDE SCH MLS/HR: 9 INJECTION, SOLUTION INTRAVENOUS at 08:47

## 2019-02-19 RX ADMIN — FOLIC ACID SCH MLS/HR: 5 INJECTION, SOLUTION INTRAMUSCULAR; INTRAVENOUS; SUBCUTANEOUS at 17:13

## 2019-02-19 RX ADMIN — IPRATROPIUM BROMIDE AND ALBUTEROL SULFATE SCH ML: .5; 3 SOLUTION RESPIRATORY (INHALATION) at 19:48

## 2019-02-19 RX ADMIN — FOLIC ACID SCH MG: 1 TABLET ORAL at 08:48

## 2019-02-19 RX ADMIN — SODIUM CHLORIDE SCH MLS/HR: 9 INJECTION, SOLUTION INTRAVENOUS at 20:43

## 2019-02-19 RX ADMIN — IPRATROPIUM BROMIDE AND ALBUTEROL SULFATE SCH ML: .5; 3 SOLUTION RESPIRATORY (INHALATION) at 08:04

## 2019-02-19 RX ADMIN — METRONIDAZOLE SCH MLS/HR: 500 INJECTION, SOLUTION INTRAVENOUS at 17:53

## 2019-02-19 RX ADMIN — SODIUM CHLORIDE PRN MLS/HR: 9 INJECTION, SOLUTION INTRAVENOUS at 01:40

## 2019-02-19 RX ADMIN — Medication SCH MG: at 08:48

## 2019-02-19 RX ADMIN — SODIUM CHLORIDE PRN MLS/HR: 9 INJECTION, SOLUTION INTRAVENOUS at 19:43

## 2019-02-19 RX ADMIN — FOLIC ACID SCH MLS/HR: 5 INJECTION, SOLUTION INTRAMUSCULAR; INTRAVENOUS; SUBCUTANEOUS at 10:16

## 2019-02-19 RX ADMIN — IPRATROPIUM BROMIDE AND ALBUTEROL SULFATE SCH ML: .5; 3 SOLUTION RESPIRATORY (INHALATION) at 14:03

## 2019-02-19 RX ADMIN — LISINOPRIL SCH MG: 20 TABLET ORAL at 08:48

## 2019-02-19 RX ADMIN — METOPROLOL TARTRATE SCH MG: 25 TABLET ORAL at 08:48

## 2019-02-19 RX ADMIN — FOLIC ACID SCH MLS/HR: 5 INJECTION, SOLUTION INTRAMUSCULAR; INTRAVENOUS; SUBCUTANEOUS at 19:37

## 2019-02-19 RX ADMIN — METRONIDAZOLE SCH MLS/HR: 500 INJECTION, SOLUTION INTRAVENOUS at 01:42

## 2019-02-19 RX ADMIN — Medication SCH MCG: at 08:48

## 2019-02-19 RX ADMIN — ENOXAPARIN SODIUM SCH MG: 100 INJECTION SUBCUTANEOUS at 20:43

## 2019-02-19 RX ADMIN — ENOXAPARIN SODIUM SCH MG: 100 INJECTION SUBCUTANEOUS at 08:47

## 2019-02-19 RX ADMIN — DEXTROSE MONOHYDRATE PRN MLS/HR: 50 INJECTION, SOLUTION INTRAVENOUS at 10:13

## 2019-02-19 RX ADMIN — METRONIDAZOLE SCH MLS/HR: 500 INJECTION, SOLUTION INTRAVENOUS at 12:57

## 2019-02-19 RX ADMIN — IPRATROPIUM BROMIDE AND ALBUTEROL SULFATE SCH ML: .5; 3 SOLUTION RESPIRATORY (INHALATION) at 02:47

## 2019-02-19 RX ADMIN — LORAZEPAM PRN MG: 2 INJECTION INTRAMUSCULAR; INTRAVENOUS at 21:55

## 2019-02-19 RX ADMIN — Medication SCH UDTAB: at 08:48

## 2019-02-19 RX ADMIN — SODIUM CHLORIDE PRN MLS/HR: 9 INJECTION, SOLUTION INTRAVENOUS at 10:13

## 2019-02-20 VITALS — DIASTOLIC BLOOD PRESSURE: 100 MMHG | SYSTOLIC BLOOD PRESSURE: 148 MMHG

## 2019-02-20 VITALS — SYSTOLIC BLOOD PRESSURE: 145 MMHG | DIASTOLIC BLOOD PRESSURE: 95 MMHG

## 2019-02-20 VITALS — DIASTOLIC BLOOD PRESSURE: 106 MMHG | SYSTOLIC BLOOD PRESSURE: 166 MMHG

## 2019-02-20 VITALS — SYSTOLIC BLOOD PRESSURE: 149 MMHG | DIASTOLIC BLOOD PRESSURE: 93 MMHG

## 2019-02-20 VITALS — DIASTOLIC BLOOD PRESSURE: 112 MMHG | SYSTOLIC BLOOD PRESSURE: 167 MMHG

## 2019-02-20 VITALS — SYSTOLIC BLOOD PRESSURE: 151 MMHG | DIASTOLIC BLOOD PRESSURE: 81 MMHG

## 2019-02-20 VITALS — SYSTOLIC BLOOD PRESSURE: 167 MMHG | DIASTOLIC BLOOD PRESSURE: 93 MMHG

## 2019-02-20 VITALS — DIASTOLIC BLOOD PRESSURE: 87 MMHG | SYSTOLIC BLOOD PRESSURE: 148 MMHG

## 2019-02-20 VITALS — SYSTOLIC BLOOD PRESSURE: 152 MMHG | DIASTOLIC BLOOD PRESSURE: 113 MMHG

## 2019-02-20 VITALS — SYSTOLIC BLOOD PRESSURE: 146 MMHG | DIASTOLIC BLOOD PRESSURE: 99 MMHG

## 2019-02-20 VITALS — DIASTOLIC BLOOD PRESSURE: 71 MMHG | SYSTOLIC BLOOD PRESSURE: 151 MMHG

## 2019-02-20 VITALS — DIASTOLIC BLOOD PRESSURE: 95 MMHG | SYSTOLIC BLOOD PRESSURE: 154 MMHG

## 2019-02-20 VITALS — SYSTOLIC BLOOD PRESSURE: 151 MMHG | DIASTOLIC BLOOD PRESSURE: 86 MMHG

## 2019-02-20 VITALS — DIASTOLIC BLOOD PRESSURE: 102 MMHG | SYSTOLIC BLOOD PRESSURE: 158 MMHG

## 2019-02-20 VITALS — SYSTOLIC BLOOD PRESSURE: 202 MMHG | DIASTOLIC BLOOD PRESSURE: 161 MMHG

## 2019-02-20 VITALS — SYSTOLIC BLOOD PRESSURE: 137 MMHG | DIASTOLIC BLOOD PRESSURE: 93 MMHG

## 2019-02-20 VITALS — DIASTOLIC BLOOD PRESSURE: 96 MMHG | SYSTOLIC BLOOD PRESSURE: 153 MMHG

## 2019-02-20 VITALS — SYSTOLIC BLOOD PRESSURE: 139 MMHG | DIASTOLIC BLOOD PRESSURE: 104 MMHG

## 2019-02-20 VITALS — SYSTOLIC BLOOD PRESSURE: 157 MMHG | DIASTOLIC BLOOD PRESSURE: 107 MMHG

## 2019-02-20 VITALS — DIASTOLIC BLOOD PRESSURE: 103 MMHG | SYSTOLIC BLOOD PRESSURE: 141 MMHG

## 2019-02-20 VITALS — DIASTOLIC BLOOD PRESSURE: 96 MMHG | SYSTOLIC BLOOD PRESSURE: 144 MMHG

## 2019-02-20 VITALS — DIASTOLIC BLOOD PRESSURE: 93 MMHG | SYSTOLIC BLOOD PRESSURE: 154 MMHG

## 2019-02-20 VITALS — SYSTOLIC BLOOD PRESSURE: 155 MMHG | DIASTOLIC BLOOD PRESSURE: 94 MMHG

## 2019-02-20 VITALS — SYSTOLIC BLOOD PRESSURE: 162 MMHG | DIASTOLIC BLOOD PRESSURE: 97 MMHG

## 2019-02-20 VITALS — DIASTOLIC BLOOD PRESSURE: 92 MMHG | SYSTOLIC BLOOD PRESSURE: 131 MMHG

## 2019-02-20 VITALS — SYSTOLIC BLOOD PRESSURE: 138 MMHG | DIASTOLIC BLOOD PRESSURE: 101 MMHG

## 2019-02-20 VITALS — SYSTOLIC BLOOD PRESSURE: 165 MMHG | DIASTOLIC BLOOD PRESSURE: 105 MMHG

## 2019-02-20 VITALS — DIASTOLIC BLOOD PRESSURE: 93 MMHG | SYSTOLIC BLOOD PRESSURE: 167 MMHG

## 2019-02-20 VITALS — DIASTOLIC BLOOD PRESSURE: 105 MMHG | SYSTOLIC BLOOD PRESSURE: 176 MMHG

## 2019-02-20 VITALS — SYSTOLIC BLOOD PRESSURE: 147 MMHG | DIASTOLIC BLOOD PRESSURE: 91 MMHG

## 2019-02-20 VITALS — SYSTOLIC BLOOD PRESSURE: 165 MMHG | DIASTOLIC BLOOD PRESSURE: 85 MMHG

## 2019-02-20 VITALS — DIASTOLIC BLOOD PRESSURE: 102 MMHG | SYSTOLIC BLOOD PRESSURE: 156 MMHG

## 2019-02-20 VITALS — SYSTOLIC BLOOD PRESSURE: 146 MMHG | DIASTOLIC BLOOD PRESSURE: 91 MMHG

## 2019-02-20 VITALS — SYSTOLIC BLOOD PRESSURE: 155 MMHG | DIASTOLIC BLOOD PRESSURE: 95 MMHG

## 2019-02-20 VITALS — DIASTOLIC BLOOD PRESSURE: 78 MMHG | SYSTOLIC BLOOD PRESSURE: 160 MMHG

## 2019-02-20 VITALS — SYSTOLIC BLOOD PRESSURE: 159 MMHG | DIASTOLIC BLOOD PRESSURE: 94 MMHG

## 2019-02-20 VITALS — SYSTOLIC BLOOD PRESSURE: 154 MMHG | DIASTOLIC BLOOD PRESSURE: 94 MMHG

## 2019-02-20 VITALS — SYSTOLIC BLOOD PRESSURE: 177 MMHG | DIASTOLIC BLOOD PRESSURE: 111 MMHG

## 2019-02-20 VITALS — DIASTOLIC BLOOD PRESSURE: 91 MMHG | SYSTOLIC BLOOD PRESSURE: 141 MMHG

## 2019-02-20 VITALS — SYSTOLIC BLOOD PRESSURE: 165 MMHG | DIASTOLIC BLOOD PRESSURE: 95 MMHG

## 2019-02-20 LAB
BASE EXCESS BLDA CALC-SCNC: 4 MMOL/L (ref -2–2)
BASE EXCESS BLDA CALC-SCNC: 6.5 MMOL/L (ref -2–2)
BASOPHILS NFR BLD MANUAL: 0.4 % (ref 0–2)
BG TOTAL RESPIRATORY RATE: 18 B/MIN
BG TOTAL RESPIRATORY RATE: 20 B/MIN
BG VENT RATE: 16 SET
BIPAP ANDOR CPAP SETTING VENT: 5 CM(H2O)
CHLORIDE SERPL-SCNC: 106 MEQ/L (ref 98–107)
COHGB MFR BLDA: 0.8 % (ref 0.5–1.5)
COHGB MFR BLDA: 1 % (ref 0.5–1.5)
DO-HGB MFR BLDA: 1.5 % (ref 0–5)
DO-HGB MFR BLDA: 3.2 % (ref 0–5)
EOSINOPHIL NFR BLD MANUAL: 4 % (ref 0–5)
ERYTHROCYTE [DISTWIDTH] IN BLOOD BY AUTOMATED COUNT: 17.2 % (ref 11.6–14.6)
HCO3 BLDA-SCNC: 29.2 MMOL/L (ref 22–26)
HCO3 BLDA-SCNC: 31.6 MMOL/L (ref 22–26)
HCT VFR BLD AUTO: 36.4 % (ref 42–52)
HGB BLD-MCNC: 11.8 G/DL (ref 14–18)
HGB BLDA OXIMETRY-MCNC: 12.6 G/DL (ref 12–18)
HGB BLDA OXIMETRY-MCNC: 12.9 G/DL (ref 12–18)
INHALED O2 CONCENTRATION: 45 %
INHALED O2 CONCENTRATION: 45 %
LYMPHOCYTES NFR BLD MANUAL: 40.7 % (ref 20–50)
MCH RBC QN AUTO: 26.6 PG (ref 28–32)
MCV RBC AUTO: 82.2 FL (ref 80–94)
METHGB MFR BLDA: 0 % (ref 0–1.5)
METHGB MFR BLDA: 0.2 % (ref 0–1.5)
MONOCYTES NFR BLD MANUAL: 6.5 % (ref 2–8)
NEUTS SEG NFR BLD MANUAL: 48.4 % (ref 45–75)
OXYHGB MFR BLDA: 96 % (ref 94–97)
OXYHGB MFR BLDA: 97.3 % (ref 94–97)
PCO2 TEMP ADJ BLDA: 46.2 MMHG (ref 35–45)
PCO2 TEMP ADJ BLDA: 47.5 MMHG (ref 35–45)
PEEP SETTING VENT: 600 ML
PH TEMP ADJ BLDA: 7.42 [PH] (ref 7.35–7.45)
PH TEMP ADJ BLDA: 7.44 [PH] (ref 7.35–7.45)
PLATELET # BLD AUTO: 121 X1000/UL (ref 130–400)
PLATELET # BLD EST: (no result) 10*3/UL
PMV BLD AUTO: 8.4 FL (ref 7.4–10.4)
PO2 TEMP ADJ BLDA: 135.8 MMHG (ref 75–100)
PO2 TEMP ADJ BLDA: 97 MMHG (ref 75–100)
PRESSURE SUPPORT SETTING VENT: 8 CM[H2O]
RBC # BLD AUTO: 4.43 MILL/UL (ref 4.7–6.1)
SAO2 % BLDA: 96.8 % (ref 92–98.5)
SAO2 % BLDA: 98.5 % (ref 92–98.5)
SPECIMEN DRAWN FROM PATIENT: (no result)
SPECIMEN DRAWN FROM PATIENT: (no result)
VENTILATION MODE VENT: (no result)
VENTILATION MODE VENT: (no result)

## 2019-02-20 RX ADMIN — IPRATROPIUM BROMIDE AND ALBUTEROL SULFATE SCH ML: .5; 3 SOLUTION RESPIRATORY (INHALATION) at 01:49

## 2019-02-20 RX ADMIN — PANTOPRAZOLE SODIUM SCH MG: 40 INJECTION, POWDER, FOR SOLUTION INTRAVENOUS at 08:36

## 2019-02-20 RX ADMIN — IPRATROPIUM BROMIDE AND ALBUTEROL SULFATE SCH ML: .5; 3 SOLUTION RESPIRATORY (INHALATION) at 08:26

## 2019-02-20 RX ADMIN — METOPROLOL TARTRATE SCH MG: 50 TABLET, FILM COATED ORAL at 21:04

## 2019-02-20 RX ADMIN — RACEPINEPHRINE HYDROCHLORIDE PRN ML: 11.25 SOLUTION RESPIRATORY (INHALATION) at 15:55

## 2019-02-20 RX ADMIN — SODIUM CHLORIDE PRN MLS/HR: 9 INJECTION, SOLUTION INTRAVENOUS at 07:55

## 2019-02-20 RX ADMIN — SODIUM CHLORIDE SCH MLS/HR: 9 INJECTION, SOLUTION INTRAVENOUS at 08:36

## 2019-02-20 RX ADMIN — LORAZEPAM PRN MG: 2 INJECTION INTRAMUSCULAR; INTRAVENOUS at 02:05

## 2019-02-20 RX ADMIN — Medication SCH MG: at 08:36

## 2019-02-20 RX ADMIN — RACEPINEPHRINE HYDROCHLORIDE PRN ML: 11.25 SOLUTION RESPIRATORY (INHALATION) at 20:44

## 2019-02-20 RX ADMIN — SODIUM CHLORIDE PRN MLS/HR: 9 INJECTION, SOLUTION INTRAVENOUS at 03:38

## 2019-02-20 RX ADMIN — METOPROLOL TARTRATE SCH MG: 25 TABLET ORAL at 08:36

## 2019-02-20 RX ADMIN — ENOXAPARIN SODIUM SCH MG: 100 INJECTION SUBCUTANEOUS at 08:37

## 2019-02-20 RX ADMIN — FOLIC ACID SCH MLS/HR: 5 INJECTION, SOLUTION INTRAMUSCULAR; INTRAVENOUS; SUBCUTANEOUS at 14:47

## 2019-02-20 RX ADMIN — METRONIDAZOLE SCH MLS/HR: 500 INJECTION, SOLUTION INTRAVENOUS at 02:10

## 2019-02-20 RX ADMIN — FOLIC ACID SCH MG: 1 TABLET ORAL at 08:36

## 2019-02-20 RX ADMIN — Medication SCH MCG: at 08:36

## 2019-02-20 RX ADMIN — SODIUM CHLORIDE SCH MLS/HR: 9 INJECTION, SOLUTION INTRAVENOUS at 21:04

## 2019-02-20 RX ADMIN — METRONIDAZOLE SCH MLS/HR: 500 INJECTION, SOLUTION INTRAVENOUS at 17:31

## 2019-02-20 RX ADMIN — ENOXAPARIN SODIUM SCH MG: 100 INJECTION SUBCUTANEOUS at 21:03

## 2019-02-20 RX ADMIN — IPRATROPIUM BROMIDE AND ALBUTEROL SULFATE SCH ML: .5; 3 SOLUTION RESPIRATORY (INHALATION) at 14:09

## 2019-02-20 RX ADMIN — METRONIDAZOLE SCH MLS/HR: 500 INJECTION, SOLUTION INTRAVENOUS at 09:45

## 2019-02-20 RX ADMIN — LISINOPRIL SCH MG: 20 TABLET ORAL at 08:36

## 2019-02-20 RX ADMIN — IPRATROPIUM BROMIDE AND ALBUTEROL SULFATE SCH ML: .5; 3 SOLUTION RESPIRATORY (INHALATION) at 20:57

## 2019-02-20 RX ADMIN — Medication SCH UDTAB: at 08:36

## 2019-02-20 RX ADMIN — FOLIC ACID SCH MLS/HR: 5 INJECTION, SOLUTION INTRAMUSCULAR; INTRAVENOUS; SUBCUTANEOUS at 05:05

## 2019-02-20 RX ADMIN — DEXTROSE MONOHYDRATE PRN MLS/HR: 50 INJECTION, SOLUTION INTRAVENOUS at 01:20

## 2019-02-21 VITALS — DIASTOLIC BLOOD PRESSURE: 101 MMHG | SYSTOLIC BLOOD PRESSURE: 164 MMHG

## 2019-02-21 VITALS — DIASTOLIC BLOOD PRESSURE: 94 MMHG | SYSTOLIC BLOOD PRESSURE: 147 MMHG

## 2019-02-21 VITALS — DIASTOLIC BLOOD PRESSURE: 83 MMHG | SYSTOLIC BLOOD PRESSURE: 157 MMHG

## 2019-02-21 VITALS — SYSTOLIC BLOOD PRESSURE: 136 MMHG | DIASTOLIC BLOOD PRESSURE: 84 MMHG

## 2019-02-21 VITALS — DIASTOLIC BLOOD PRESSURE: 103 MMHG | SYSTOLIC BLOOD PRESSURE: 154 MMHG

## 2019-02-21 VITALS — SYSTOLIC BLOOD PRESSURE: 137 MMHG | DIASTOLIC BLOOD PRESSURE: 65 MMHG

## 2019-02-21 VITALS — SYSTOLIC BLOOD PRESSURE: 150 MMHG | DIASTOLIC BLOOD PRESSURE: 101 MMHG

## 2019-02-21 VITALS — DIASTOLIC BLOOD PRESSURE: 94 MMHG | SYSTOLIC BLOOD PRESSURE: 152 MMHG

## 2019-02-21 VITALS — SYSTOLIC BLOOD PRESSURE: 171 MMHG | DIASTOLIC BLOOD PRESSURE: 144 MMHG

## 2019-02-21 VITALS — DIASTOLIC BLOOD PRESSURE: 96 MMHG | SYSTOLIC BLOOD PRESSURE: 168 MMHG

## 2019-02-21 VITALS — SYSTOLIC BLOOD PRESSURE: 139 MMHG | DIASTOLIC BLOOD PRESSURE: 75 MMHG

## 2019-02-21 VITALS — SYSTOLIC BLOOD PRESSURE: 155 MMHG | DIASTOLIC BLOOD PRESSURE: 87 MMHG

## 2019-02-21 VITALS — SYSTOLIC BLOOD PRESSURE: 140 MMHG | DIASTOLIC BLOOD PRESSURE: 67 MMHG

## 2019-02-21 VITALS — DIASTOLIC BLOOD PRESSURE: 93 MMHG | SYSTOLIC BLOOD PRESSURE: 145 MMHG

## 2019-02-21 VITALS — SYSTOLIC BLOOD PRESSURE: 164 MMHG | DIASTOLIC BLOOD PRESSURE: 77 MMHG

## 2019-02-21 VITALS — SYSTOLIC BLOOD PRESSURE: 164 MMHG | DIASTOLIC BLOOD PRESSURE: 83 MMHG

## 2019-02-21 VITALS — DIASTOLIC BLOOD PRESSURE: 94 MMHG | SYSTOLIC BLOOD PRESSURE: 157 MMHG

## 2019-02-21 VITALS — DIASTOLIC BLOOD PRESSURE: 110 MMHG | SYSTOLIC BLOOD PRESSURE: 151 MMHG

## 2019-02-21 VITALS — SYSTOLIC BLOOD PRESSURE: 147 MMHG | DIASTOLIC BLOOD PRESSURE: 97 MMHG

## 2019-02-21 VITALS — SYSTOLIC BLOOD PRESSURE: 152 MMHG | DIASTOLIC BLOOD PRESSURE: 111 MMHG

## 2019-02-21 VITALS — DIASTOLIC BLOOD PRESSURE: 107 MMHG | SYSTOLIC BLOOD PRESSURE: 165 MMHG

## 2019-02-21 VITALS — SYSTOLIC BLOOD PRESSURE: 160 MMHG | DIASTOLIC BLOOD PRESSURE: 113 MMHG

## 2019-02-21 VITALS — DIASTOLIC BLOOD PRESSURE: 104 MMHG | SYSTOLIC BLOOD PRESSURE: 170 MMHG

## 2019-02-21 VITALS — DIASTOLIC BLOOD PRESSURE: 94 MMHG | SYSTOLIC BLOOD PRESSURE: 159 MMHG

## 2019-02-21 VITALS — SYSTOLIC BLOOD PRESSURE: 154 MMHG | DIASTOLIC BLOOD PRESSURE: 96 MMHG

## 2019-02-21 VITALS — DIASTOLIC BLOOD PRESSURE: 91 MMHG | SYSTOLIC BLOOD PRESSURE: 155 MMHG

## 2019-02-21 VITALS — SYSTOLIC BLOOD PRESSURE: 133 MMHG | DIASTOLIC BLOOD PRESSURE: 72 MMHG

## 2019-02-21 VITALS — SYSTOLIC BLOOD PRESSURE: 153 MMHG | DIASTOLIC BLOOD PRESSURE: 103 MMHG

## 2019-02-21 VITALS — DIASTOLIC BLOOD PRESSURE: 79 MMHG | SYSTOLIC BLOOD PRESSURE: 136 MMHG

## 2019-02-21 VITALS — SYSTOLIC BLOOD PRESSURE: 158 MMHG | DIASTOLIC BLOOD PRESSURE: 25 MMHG

## 2019-02-21 VITALS — DIASTOLIC BLOOD PRESSURE: 98 MMHG | SYSTOLIC BLOOD PRESSURE: 134 MMHG

## 2019-02-21 VITALS — SYSTOLIC BLOOD PRESSURE: 156 MMHG | DIASTOLIC BLOOD PRESSURE: 96 MMHG

## 2019-02-21 LAB
BASOPHILS NFR BLD AUTO: 0.3 % (ref 0–2)
CHLORIDE SERPL-SCNC: 105 MEQ/L (ref 98–107)
EOSINOPHIL NFR BLD AUTO: 5.7 % (ref 0–5)
ERYTHROCYTE [DISTWIDTH] IN BLOOD BY AUTOMATED COUNT: 16.5 % (ref 11.6–14.6)
HCT VFR BLD AUTO: 35.4 % (ref 42–52)
HGB BLD-MCNC: 11.5 G/DL (ref 14–18)
LYMPHOCYTES NFR BLD AUTO: 29.5 % (ref 20–50)
MCH RBC QN AUTO: 26.8 PG (ref 28–32)
MCV RBC AUTO: 82.4 FL (ref 80–94)
MONOCYTES NFR BLD AUTO: 6.2 % (ref 2–8)
NEUTROPHILS NFR BLD AUTO: 58.3 % (ref 40–76)
PLATELET # BLD AUTO: 125 X1000/UL (ref 130–400)
PMV BLD AUTO: 8.4 FL (ref 7.4–10.4)
RBC # BLD AUTO: 4.29 MILL/UL (ref 4.7–6.1)

## 2019-02-21 RX ADMIN — AMLODIPINE BESYLATE SCH MG: 5 TABLET ORAL at 11:29

## 2019-02-21 RX ADMIN — IPRATROPIUM BROMIDE AND ALBUTEROL SULFATE SCH ML: .5; 3 SOLUTION RESPIRATORY (INHALATION) at 20:28

## 2019-02-21 RX ADMIN — ENOXAPARIN SODIUM SCH MG: 100 INJECTION SUBCUTANEOUS at 08:33

## 2019-02-21 RX ADMIN — FOLIC ACID SCH MLS/HR: 5 INJECTION, SOLUTION INTRAMUSCULAR; INTRAVENOUS; SUBCUTANEOUS at 10:21

## 2019-02-21 RX ADMIN — METOPROLOL TARTRATE SCH MG: 50 TABLET, FILM COATED ORAL at 22:39

## 2019-02-21 RX ADMIN — AMLODIPINE BESYLATE SCH MG: 5 TABLET ORAL at 22:39

## 2019-02-21 RX ADMIN — METRONIDAZOLE SCH MLS/HR: 500 INJECTION, SOLUTION INTRAVENOUS at 01:06

## 2019-02-21 RX ADMIN — IPRATROPIUM BROMIDE AND ALBUTEROL SULFATE SCH ML: .5; 3 SOLUTION RESPIRATORY (INHALATION) at 13:29

## 2019-02-21 RX ADMIN — SODIUM CHLORIDE SCH MLS/HR: 9 INJECTION, SOLUTION INTRAVENOUS at 22:42

## 2019-02-21 RX ADMIN — METRONIDAZOLE SCH MLS/HR: 500 INJECTION, SOLUTION INTRAVENOUS at 10:13

## 2019-02-21 RX ADMIN — Medication SCH MG: at 08:33

## 2019-02-21 RX ADMIN — IPRATROPIUM BROMIDE AND ALBUTEROL SULFATE SCH ML: .5; 3 SOLUTION RESPIRATORY (INHALATION) at 08:09

## 2019-02-21 RX ADMIN — IPRATROPIUM BROMIDE AND ALBUTEROL SULFATE SCH ML: .5; 3 SOLUTION RESPIRATORY (INHALATION) at 01:32

## 2019-02-21 RX ADMIN — METOPROLOL TARTRATE SCH MG: 50 TABLET, FILM COATED ORAL at 08:33

## 2019-02-21 RX ADMIN — SODIUM CHLORIDE SCH MLS/HR: 9 INJECTION, SOLUTION INTRAVENOUS at 08:32

## 2019-02-21 RX ADMIN — PANTOPRAZOLE SODIUM SCH MG: 40 INJECTION, POWDER, FOR SOLUTION INTRAVENOUS at 08:32

## 2019-02-21 RX ADMIN — FOLIC ACID SCH MLS/HR: 5 INJECTION, SOLUTION INTRAMUSCULAR; INTRAVENOUS; SUBCUTANEOUS at 00:54

## 2019-02-21 RX ADMIN — FOLIC ACID SCH MG: 1 TABLET ORAL at 08:33

## 2019-02-21 RX ADMIN — Medication SCH MCG: at 08:33

## 2019-02-21 RX ADMIN — ENOXAPARIN SODIUM SCH MG: 100 INJECTION SUBCUTANEOUS at 22:39

## 2019-02-21 RX ADMIN — Medication SCH UDTAB: at 08:33

## 2019-02-21 RX ADMIN — METRONIDAZOLE SCH MLS/HR: 500 INJECTION, SOLUTION INTRAVENOUS at 17:03

## 2019-02-22 VITALS — SYSTOLIC BLOOD PRESSURE: 140 MMHG | DIASTOLIC BLOOD PRESSURE: 90 MMHG

## 2019-02-22 VITALS — DIASTOLIC BLOOD PRESSURE: 88 MMHG | SYSTOLIC BLOOD PRESSURE: 133 MMHG

## 2019-02-22 VITALS — DIASTOLIC BLOOD PRESSURE: 94 MMHG | SYSTOLIC BLOOD PRESSURE: 152 MMHG

## 2019-02-22 VITALS — DIASTOLIC BLOOD PRESSURE: 90 MMHG | SYSTOLIC BLOOD PRESSURE: 132 MMHG

## 2019-02-22 VITALS — DIASTOLIC BLOOD PRESSURE: 83 MMHG | SYSTOLIC BLOOD PRESSURE: 148 MMHG

## 2019-02-22 VITALS — DIASTOLIC BLOOD PRESSURE: 85 MMHG | SYSTOLIC BLOOD PRESSURE: 148 MMHG

## 2019-02-22 VITALS — SYSTOLIC BLOOD PRESSURE: 141 MMHG | DIASTOLIC BLOOD PRESSURE: 86 MMHG

## 2019-02-22 VITALS — DIASTOLIC BLOOD PRESSURE: 90 MMHG | SYSTOLIC BLOOD PRESSURE: 150 MMHG

## 2019-02-22 VITALS — SYSTOLIC BLOOD PRESSURE: 148 MMHG | DIASTOLIC BLOOD PRESSURE: 86 MMHG

## 2019-02-22 LAB
BASOPHILS NFR BLD AUTO: 0.2 % (ref 0–2)
CHLORIDE SERPL-SCNC: 104 MEQ/L (ref 98–107)
EOSINOPHIL NFR BLD AUTO: 4.3 % (ref 0–5)
ERYTHROCYTE [DISTWIDTH] IN BLOOD BY AUTOMATED COUNT: 16.3 % (ref 11.6–14.6)
HCT VFR BLD AUTO: 36.6 % (ref 42–52)
HGB BLD-MCNC: 12.2 G/DL (ref 14–18)
LYMPHOCYTES NFR BLD AUTO: 25.3 % (ref 20–50)
MCH RBC QN AUTO: 27 PG (ref 28–32)
MCV RBC AUTO: 80.9 FL (ref 80–94)
MONOCYTES NFR BLD AUTO: 5.1 % (ref 2–8)
NEUTROPHILS NFR BLD AUTO: 65.1 % (ref 40–76)
PLATELET # BLD AUTO: 130 X1000/UL (ref 130–400)
PMV BLD AUTO: 8.4 FL (ref 7.4–10.4)
RBC # BLD AUTO: 4.52 MILL/UL (ref 4.7–6.1)

## 2019-02-22 RX ADMIN — IPRATROPIUM BROMIDE AND ALBUTEROL SULFATE SCH ML: .5; 3 SOLUTION RESPIRATORY (INHALATION) at 00:41

## 2019-02-22 RX ADMIN — METRONIDAZOLE SCH MLS/HR: 500 INJECTION, SOLUTION INTRAVENOUS at 01:35

## 2019-04-11 ENCOUNTER — HOSPITAL ENCOUNTER (EMERGENCY)
Dept: HOSPITAL 87 - ER | Age: 39
Discharge: HOME | End: 2019-04-11
Payer: COMMERCIAL

## 2019-04-11 VITALS — DIASTOLIC BLOOD PRESSURE: 88 MMHG | SYSTOLIC BLOOD PRESSURE: 151 MMHG

## 2019-04-11 VITALS — WEIGHT: 220.46 LBS | BODY MASS INDEX: 29.86 KG/M2 | HEIGHT: 72 IN

## 2019-04-11 DIAGNOSIS — I10: ICD-10-CM
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
## 2019-04-11 DIAGNOSIS — G92: ICD-10-CM

## 2019-04-11 DIAGNOSIS — T51.0X1A: Primary | ICD-10-CM
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
## 2019-04-11 LAB
AMPHETAMINES UR QL SCN: NEGATIVE
BARBITURATES UR QL SCN: NEGATIVE
BASOPHILS NFR BLD AUTO: 1.3 % (ref 0–2)
BENZODIAZ UR QL SCN: NEGATIVE
BZE UR QL SCN: NEGATIVE
CANNABINOIDS UR QL SCN: NEGATIVE
CHLORIDE SERPL-SCNC: 105 MEQ/L (ref 98–107)
EOSINOPHIL NFR BLD AUTO: 0.3 % (ref 0–5)
ERYTHROCYTE [DISTWIDTH] IN BLOOD BY AUTOMATED COUNT: 16.7 % (ref 11.6–14.6)
ETHANOL SERPL-MCNC: 416 MG/DL
HCT VFR BLD AUTO: 41.6 % (ref 42–52)
HGB BLD-MCNC: 13.7 G/DL (ref 14–18)
LYMPHOCYTES NFR BLD AUTO: 31.6 % (ref 20–50)
MCH RBC QN AUTO: 26.6 PG (ref 28–32)
MCV RBC AUTO: 80.6 FL (ref 80–94)
METHADONE UR QL SCN: NEGATIVE
MONOCYTES NFR BLD AUTO: 2.7 % (ref 2–8)
NEUTROPHILS NFR BLD AUTO: 64.1 % (ref 40–76)
OPIATES UR QL SCN: NEGATIVE
PCP UR QL SCN: NEGATIVE
PLATELET # BLD AUTO: 302 X1000/UL (ref 130–400)
PMV BLD AUTO: 7.6 FL (ref 7.4–10.4)
RBC # BLD AUTO: 5.16 MILL/UL (ref 4.7–6.1)

## 2019-04-11 PROCEDURE — 85025 COMPLETE CBC W/AUTO DIFF WBC: CPT

## 2019-04-11 PROCEDURE — 80307 DRUG TEST PRSMV CHEM ANLYZR: CPT

## 2019-04-11 PROCEDURE — 99284 EMERGENCY DEPT VISIT MOD MDM: CPT

## 2019-04-11 PROCEDURE — 96374 THER/PROPH/DIAG INJ IV PUSH: CPT

## 2019-04-11 PROCEDURE — 36415 COLL VENOUS BLD VENIPUNCTURE: CPT

## 2019-04-11 PROCEDURE — 70450 CT HEAD/BRAIN W/O DYE: CPT

## 2019-04-11 PROCEDURE — 80329 ANALGESICS NON-OPIOID 1 OR 2: CPT

## 2019-04-11 PROCEDURE — 80305 DRUG TEST PRSMV DIR OPT OBS: CPT

## 2019-04-11 PROCEDURE — 80320 DRUG SCREEN QUANTALCOHOLS: CPT

## 2019-04-11 PROCEDURE — 80048 BASIC METABOLIC PNL TOTAL CA: CPT

## 2019-04-11 PROCEDURE — G0480 DRUG TEST DEF 1-7 CLASSES: HCPCS

## 2019-04-12 ENCOUNTER — HOSPITAL ENCOUNTER (EMERGENCY)
Dept: HOSPITAL 87 - ER | Age: 39
Discharge: LEFT BEFORE DISCHARGE | End: 2019-04-12
Payer: COMMERCIAL

## 2019-04-12 VITALS — HEIGHT: 70 IN | BODY MASS INDEX: 25.25 KG/M2 | WEIGHT: 176.37 LBS

## 2019-04-12 VITALS — DIASTOLIC BLOOD PRESSURE: 103 MMHG | SYSTOLIC BLOOD PRESSURE: 165 MMHG
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
## 2019-04-12 DIAGNOSIS — F10.10: Primary | ICD-10-CM

## 2019-04-12 DIAGNOSIS — Z53.21: ICD-10-CM

## 2019-04-14 ENCOUNTER — HOSPITAL ENCOUNTER (EMERGENCY)
Dept: HOSPITAL 87 - ER | Age: 39
LOS: 1 days | Discharge: HOME | End: 2019-04-15
Payer: COMMERCIAL

## 2019-04-14 VITALS — WEIGHT: 279.99 LBS | HEIGHT: 70 IN | BODY MASS INDEX: 40.08 KG/M2

## 2019-04-14 DIAGNOSIS — F10.229: ICD-10-CM
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
## 2019-04-14 DIAGNOSIS — R07.89: Primary | ICD-10-CM

## 2019-04-14 DIAGNOSIS — E07.9: ICD-10-CM

## 2019-04-14 DIAGNOSIS — R45.851: ICD-10-CM

## 2019-04-14 DIAGNOSIS — I10: ICD-10-CM

## 2019-04-14 LAB
AMPHETAMINES UR QL SCN: NEGATIVE
BARBITURATES UR QL SCN: NEGATIVE
BASOPHILS NFR BLD AUTO: 1.4 % (ref 0–2)
BENZODIAZ UR QL SCN: NEGATIVE
BZE UR QL SCN: NEGATIVE
CANNABINOIDS UR QL SCN: NEGATIVE
CHLORIDE SERPL-SCNC: 98 MEQ/L (ref 98–107)
EOSINOPHIL NFR BLD AUTO: 0.1 % (ref 0–5)
ERYTHROCYTE [DISTWIDTH] IN BLOOD BY AUTOMATED COUNT: 16.7 % (ref 11.6–14.6)
ETHANOL SERPL-MCNC: 402 MG/DL
HCT VFR BLD AUTO: 48.4 % (ref 42–52)
HGB BLD-MCNC: 16 G/DL (ref 14–18)
LYMPHOCYTES NFR BLD AUTO: 24.4 % (ref 20–50)
MCH RBC QN AUTO: 26.7 PG (ref 28–32)
MCV RBC AUTO: 80.6 FL (ref 80–94)
METHADONE UR QL SCN: NEGATIVE
MONOCYTES NFR BLD AUTO: 3.2 % (ref 2–8)
NEUTROPHILS NFR BLD AUTO: 70.9 % (ref 40–76)
OPIATES UR QL SCN: NEGATIVE
PCP UR QL SCN: NEGATIVE
PLATELET # BLD AUTO: 332 X1000/UL (ref 130–400)
PMV BLD AUTO: 7.2 FL (ref 7.4–10.4)
RBC # BLD AUTO: 6 MILL/UL (ref 4.7–6.1)

## 2019-04-14 PROCEDURE — 99284 EMERGENCY DEPT VISIT MOD MDM: CPT

## 2019-04-14 PROCEDURE — 71275 CT ANGIOGRAPHY CHEST: CPT

## 2019-04-14 PROCEDURE — 83880 ASSAY OF NATRIURETIC PEPTIDE: CPT

## 2019-04-14 PROCEDURE — 80053 COMPREHEN METABOLIC PANEL: CPT

## 2019-04-14 PROCEDURE — 80305 DRUG TEST PRSMV DIR OPT OBS: CPT

## 2019-04-14 PROCEDURE — 93005 ELECTROCARDIOGRAM TRACING: CPT

## 2019-04-14 PROCEDURE — 71045 X-RAY EXAM CHEST 1 VIEW: CPT

## 2019-04-14 PROCEDURE — 80320 DRUG SCREEN QUANTALCOHOLS: CPT

## 2019-04-14 PROCEDURE — 36415 COLL VENOUS BLD VENIPUNCTURE: CPT

## 2019-04-14 PROCEDURE — 85025 COMPLETE CBC W/AUTO DIFF WBC: CPT

## 2019-04-14 PROCEDURE — 84484 ASSAY OF TROPONIN QUANT: CPT

## 2019-04-14 PROCEDURE — G0480 DRUG TEST DEF 1-7 CLASSES: HCPCS

## 2019-04-14 PROCEDURE — 96361 HYDRATE IV INFUSION ADD-ON: CPT

## 2019-04-14 PROCEDURE — 85379 FIBRIN DEGRADATION QUANT: CPT

## 2019-04-14 PROCEDURE — 96360 HYDRATION IV INFUSION INIT: CPT

## 2019-04-15 ENCOUNTER — HOSPITAL ENCOUNTER (EMERGENCY)
Dept: HOSPITAL 87 - ER | Age: 39
Discharge: HOME | End: 2019-04-15
Payer: MEDICAID

## 2019-04-15 VITALS — SYSTOLIC BLOOD PRESSURE: 142 MMHG | DIASTOLIC BLOOD PRESSURE: 80 MMHG

## 2019-04-15 VITALS — DIASTOLIC BLOOD PRESSURE: 81 MMHG | SYSTOLIC BLOOD PRESSURE: 127 MMHG

## 2019-04-15 VITALS — HEIGHT: 72 IN | WEIGHT: 315 LBS | BODY MASS INDEX: 42.66 KG/M2
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
## 2019-04-15 DIAGNOSIS — Z86.73: ICD-10-CM

## 2019-04-15 DIAGNOSIS — E11.9: ICD-10-CM

## 2019-04-15 DIAGNOSIS — F10.229: Primary | ICD-10-CM

## 2019-04-15 DIAGNOSIS — R74.0: ICD-10-CM

## 2019-04-15 DIAGNOSIS — I10: ICD-10-CM

## 2019-04-15 DIAGNOSIS — F99: ICD-10-CM

## 2019-04-15 LAB
AMPHETAMINES UR QL SCN: NEGATIVE
APPEARANCE UR: CLEAR
BARBITURATES UR QL SCN: NEGATIVE
BASOPHILS NFR BLD AUTO: 1.4 % (ref 0–2)
BENZODIAZ UR QL SCN: NEGATIVE
BZE UR QL SCN: NEGATIVE
CANNABINOIDS UR QL SCN: NEGATIVE
CHLORIDE SERPL-SCNC: 98 MEQ/L (ref 98–107)
COLOR UR: YELLOW
EOSINOPHIL NFR BLD AUTO: 0 % (ref 0–5)
ERYTHROCYTE [DISTWIDTH] IN BLOOD BY AUTOMATED COUNT: 16.3 % (ref 11.6–14.6)
HCT VFR BLD AUTO: 45.9 % (ref 42–52)
HGB BLD-MCNC: 15.3 G/DL (ref 14–18)
HGB UR QL STRIP: NEGATIVE
KETONES UR STRIP-MCNC: NEGATIVE MG/DL
LEUKOCYTE ESTERASE UR QL STRIP: NEGATIVE
LYMPHOCYTES NFR BLD AUTO: 35.2 % (ref 20–50)
MCH RBC QN AUTO: 26.6 PG (ref 28–32)
MCV RBC AUTO: 79.8 FL (ref 80–94)
METHADONE UR QL SCN: NEGATIVE
MONOCYTES NFR BLD AUTO: 3.7 % (ref 2–8)
NEUTROPHILS NFR BLD AUTO: 59.7 % (ref 40–76)
NITRITE UR QL STRIP: NEGATIVE
OPIATES UR QL SCN: NEGATIVE
PCP UR QL SCN: NEGATIVE
PH UR STRIP: 6 [PH] (ref 4.5–8)
PLATELET # BLD AUTO: 274 X1000/UL (ref 130–400)
PMV BLD AUTO: 7.5 FL (ref 7.4–10.4)
PROT UR QL STRIP: (no result)
RBC # BLD AUTO: 5.75 MILL/UL (ref 4.7–6.1)
SP GR UR STRIP: 1.02 (ref 1–1.03)
UROBILINOGEN UR STRIP-MCNC: 0.2 E.U./DL (ref 0.2–1)

## 2019-04-15 PROCEDURE — 80320 DRUG SCREEN QUANTALCOHOLS: CPT

## 2019-04-15 PROCEDURE — G0480 DRUG TEST DEF 1-7 CLASSES: HCPCS

## 2019-04-15 PROCEDURE — 83690 ASSAY OF LIPASE: CPT

## 2019-04-15 PROCEDURE — 99284 EMERGENCY DEPT VISIT MOD MDM: CPT

## 2019-04-15 PROCEDURE — 85025 COMPLETE CBC W/AUTO DIFF WBC: CPT

## 2019-04-15 PROCEDURE — 74177 CT ABD & PELVIS W/CONTRAST: CPT

## 2019-04-15 PROCEDURE — 81003 URINALYSIS AUTO W/O SCOPE: CPT

## 2019-04-15 PROCEDURE — 36415 COLL VENOUS BLD VENIPUNCTURE: CPT

## 2019-04-15 PROCEDURE — 80305 DRUG TEST PRSMV DIR OPT OBS: CPT

## 2019-04-15 PROCEDURE — 80053 COMPREHEN METABOLIC PANEL: CPT

## 2019-04-16 ENCOUNTER — HOSPITAL ENCOUNTER (EMERGENCY)
Dept: HOSPITAL 87 - ER | Age: 39
LOS: 1 days | Discharge: HOME | End: 2019-04-17
Payer: COMMERCIAL

## 2019-04-16 ENCOUNTER — HOSPITAL ENCOUNTER (EMERGENCY)
Dept: HOSPITAL 87 - ER | Age: 39
Discharge: HOME | End: 2019-04-16
Payer: COMMERCIAL

## 2019-04-16 VITALS — SYSTOLIC BLOOD PRESSURE: 145 MMHG | DIASTOLIC BLOOD PRESSURE: 85 MMHG

## 2019-04-16 VITALS — HEIGHT: 76 IN | WEIGHT: 315 LBS | BODY MASS INDEX: 38.36 KG/M2

## 2019-04-16 VITALS — BODY MASS INDEX: 38.36 KG/M2 | HEIGHT: 76 IN | WEIGHT: 315 LBS

## 2019-04-16 DIAGNOSIS — E11.9: ICD-10-CM

## 2019-04-16 DIAGNOSIS — R11.2: ICD-10-CM
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
## 2019-04-16 DIAGNOSIS — E87.6: Primary | ICD-10-CM

## 2019-04-16 DIAGNOSIS — R74.0: ICD-10-CM

## 2019-04-16 DIAGNOSIS — Z59.0: ICD-10-CM

## 2019-04-16 DIAGNOSIS — R19.7: ICD-10-CM

## 2019-04-16 DIAGNOSIS — I10: ICD-10-CM

## 2019-04-16 DIAGNOSIS — Z86.73: ICD-10-CM

## 2019-04-16 DIAGNOSIS — F10.20: Primary | ICD-10-CM

## 2019-04-16 LAB
AMPHETAMINES UR QL SCN: NEGATIVE
APPEARANCE UR: (no result)
BARBITURATES UR QL SCN: NEGATIVE
BASOPHILS NFR BLD AUTO: 1.2 % (ref 0–2)
BENZODIAZ UR QL SCN: NEGATIVE
BZE UR QL SCN: NEGATIVE
CANNABINOIDS UR QL SCN: NEGATIVE
CHLORIDE SERPL-SCNC: 98 MEQ/L (ref 98–107)
COLOR UR: YELLOW
EOSINOPHIL NFR BLD AUTO: 0.1 % (ref 0–5)
ERYTHROCYTE [DISTWIDTH] IN BLOOD BY AUTOMATED COUNT: 16.4 % (ref 11.6–14.6)
ETHANOL SERPL-MCNC: 331 MG/DL
HCT VFR BLD AUTO: 42.7 % (ref 42–52)
HGB BLD-MCNC: 14.2 G/DL (ref 14–18)
HGB UR QL STRIP: NEGATIVE
KETONES UR STRIP-MCNC: NEGATIVE MG/DL
LEUKOCYTE ESTERASE UR QL STRIP: NEGATIVE
LYMPHOCYTES NFR BLD AUTO: 37.8 % (ref 20–50)
MCH RBC QN AUTO: 26.7 PG (ref 28–32)
MCV RBC AUTO: 80.3 FL (ref 80–94)
METHADONE UR QL SCN: NEGATIVE
MONOCYTES NFR BLD AUTO: 3.9 % (ref 2–8)
NEUTROPHILS NFR BLD AUTO: 57 % (ref 40–76)
NITRITE UR QL STRIP: NEGATIVE
OPIATES UR QL SCN: NEGATIVE
PCP UR QL SCN: NEGATIVE
PH UR STRIP: 5.5 [PH] (ref 4.5–8)
PLATELET # BLD AUTO: 261 X1000/UL (ref 130–400)
PMV BLD AUTO: 7.6 FL (ref 7.4–10.4)
PROT UR QL STRIP: (no result)
RBC # BLD AUTO: 5.32 MILL/UL (ref 4.7–6.1)
SP GR UR STRIP: 1.02 (ref 1–1.03)
UROBILINOGEN UR STRIP-MCNC: 0.2 E.U./DL (ref 0.2–1)

## 2019-04-16 PROCEDURE — 96375 TX/PRO/DX INJ NEW DRUG ADDON: CPT

## 2019-04-16 PROCEDURE — 85025 COMPLETE CBC W/AUTO DIFF WBC: CPT

## 2019-04-16 PROCEDURE — 83690 ASSAY OF LIPASE: CPT

## 2019-04-16 PROCEDURE — 36415 COLL VENOUS BLD VENIPUNCTURE: CPT

## 2019-04-16 PROCEDURE — 80305 DRUG TEST PRSMV DIR OPT OBS: CPT

## 2019-04-16 PROCEDURE — 99284 EMERGENCY DEPT VISIT MOD MDM: CPT

## 2019-04-16 PROCEDURE — 71045 X-RAY EXAM CHEST 1 VIEW: CPT

## 2019-04-16 PROCEDURE — 84484 ASSAY OF TROPONIN QUANT: CPT

## 2019-04-16 PROCEDURE — 83880 ASSAY OF NATRIURETIC PEPTIDE: CPT

## 2019-04-16 PROCEDURE — 99283 EMERGENCY DEPT VISIT LOW MDM: CPT

## 2019-04-16 PROCEDURE — 80320 DRUG SCREEN QUANTALCOHOLS: CPT

## 2019-04-16 PROCEDURE — 80053 COMPREHEN METABOLIC PANEL: CPT

## 2019-04-16 PROCEDURE — 96361 HYDRATE IV INFUSION ADD-ON: CPT

## 2019-04-16 PROCEDURE — 81003 URINALYSIS AUTO W/O SCOPE: CPT

## 2019-04-16 PROCEDURE — G0480 DRUG TEST DEF 1-7 CLASSES: HCPCS

## 2019-04-16 PROCEDURE — 96374 THER/PROPH/DIAG INJ IV PUSH: CPT

## 2019-04-16 PROCEDURE — 93005 ELECTROCARDIOGRAM TRACING: CPT

## 2019-04-16 SDOH — ECONOMIC STABILITY - HOUSING INSECURITY: HOMELESSNESS: Z59.0

## 2019-04-17 ENCOUNTER — HOSPITAL ENCOUNTER (EMERGENCY)
Dept: HOSPITAL 87 - ER | Age: 39
Discharge: HOME | End: 2019-04-17
Payer: COMMERCIAL

## 2019-04-17 VITALS — BODY MASS INDEX: 38.36 KG/M2 | WEIGHT: 315 LBS | HEIGHT: 76 IN

## 2019-04-17 VITALS — SYSTOLIC BLOOD PRESSURE: 133 MMHG | DIASTOLIC BLOOD PRESSURE: 80 MMHG

## 2019-04-17 VITALS — SYSTOLIC BLOOD PRESSURE: 147 MMHG | DIASTOLIC BLOOD PRESSURE: 78 MMHG

## 2019-04-17 DIAGNOSIS — I10: ICD-10-CM

## 2019-04-17 DIAGNOSIS — Z86.73: ICD-10-CM
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
## 2019-04-17 DIAGNOSIS — F10.229: Primary | ICD-10-CM
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
## 2019-04-17 DIAGNOSIS — E11.9: ICD-10-CM

## 2019-04-17 DIAGNOSIS — S91.109A: ICD-10-CM
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
## 2019-04-17 LAB
BASOPHILS NFR BLD AUTO: 0.7 % (ref 0–2)
BASOPHILS NFR BLD AUTO: 0.9 % (ref 0–2)
CHLORIDE SERPL-SCNC: 101 MEQ/L (ref 98–107)
CHLORIDE SERPL-SCNC: 99 MEQ/L (ref 98–107)
EOSINOPHIL NFR BLD AUTO: 0.1 % (ref 0–5)
EOSINOPHIL NFR BLD AUTO: 0.1 % (ref 0–5)
ERYTHROCYTE [DISTWIDTH] IN BLOOD BY AUTOMATED COUNT: 16.1 % (ref 11.6–14.6)
ERYTHROCYTE [DISTWIDTH] IN BLOOD BY AUTOMATED COUNT: 17.1 % (ref 11.6–14.6)
ETHANOL SERPL-MCNC: 455 MG/DL
HCT VFR BLD AUTO: 43.6 % (ref 42–52)
HCT VFR BLD AUTO: 45.2 % (ref 42–52)
HGB BLD-MCNC: 14.6 G/DL (ref 14–18)
HGB BLD-MCNC: 15 G/DL (ref 14–18)
LYMPHOCYTES NFR BLD AUTO: 27.8 % (ref 20–50)
LYMPHOCYTES NFR BLD AUTO: 47.9 % (ref 20–50)
MCH RBC QN AUTO: 26.7 PG (ref 28–32)
MCH RBC QN AUTO: 26.9 PG (ref 28–32)
MCV RBC AUTO: 80.2 FL (ref 80–94)
MCV RBC AUTO: 80.6 FL (ref 80–94)
MONOCYTES NFR BLD AUTO: 2.4 % (ref 2–8)
MONOCYTES NFR BLD AUTO: 4.9 % (ref 2–8)
NEUTROPHILS NFR BLD AUTO: 46.2 % (ref 40–76)
NEUTROPHILS NFR BLD AUTO: 69 % (ref 40–76)
PLATELET # BLD AUTO: 229 X1000/UL (ref 130–400)
PLATELET # BLD AUTO: 254 X1000/UL (ref 130–400)
PMV BLD AUTO: 7.4 FL (ref 7.4–10.4)
PMV BLD AUTO: 7.8 FL (ref 7.4–10.4)
RBC # BLD AUTO: 5.44 MILL/UL (ref 4.7–6.1)
RBC # BLD AUTO: 5.61 MILL/UL (ref 4.7–6.1)

## 2019-04-17 PROCEDURE — 80053 COMPREHEN METABOLIC PANEL: CPT

## 2019-04-17 PROCEDURE — 36415 COLL VENOUS BLD VENIPUNCTURE: CPT

## 2019-04-17 PROCEDURE — 96374 THER/PROPH/DIAG INJ IV PUSH: CPT

## 2019-04-17 PROCEDURE — 93005 ELECTROCARDIOGRAM TRACING: CPT

## 2019-04-17 PROCEDURE — 85025 COMPLETE CBC W/AUTO DIFF WBC: CPT

## 2019-04-17 PROCEDURE — 90471 IMMUNIZATION ADMIN: CPT

## 2019-04-17 PROCEDURE — 96361 HYDRATE IV INFUSION ADD-ON: CPT

## 2019-04-17 PROCEDURE — G0480 DRUG TEST DEF 1-7 CLASSES: HCPCS

## 2019-04-17 PROCEDURE — 99284 EMERGENCY DEPT VISIT MOD MDM: CPT

## 2019-04-17 PROCEDURE — 73630 X-RAY EXAM OF FOOT: CPT

## 2019-04-17 PROCEDURE — 80320 DRUG SCREEN QUANTALCOHOLS: CPT

## 2019-04-17 PROCEDURE — 70450 CT HEAD/BRAIN W/O DYE: CPT

## 2019-04-17 PROCEDURE — 90715 TDAP VACCINE 7 YRS/> IM: CPT

## 2019-04-18 ENCOUNTER — HOSPITAL ENCOUNTER (EMERGENCY)
Dept: HOSPITAL 87 - ER | Age: 39
LOS: 1 days | Discharge: HOME | End: 2019-04-19
Payer: COMMERCIAL

## 2019-04-18 VITALS — WEIGHT: 220.46 LBS | BODY MASS INDEX: 31.56 KG/M2 | HEIGHT: 70 IN

## 2019-04-18 DIAGNOSIS — E11.9: ICD-10-CM

## 2019-04-18 DIAGNOSIS — R45.851: ICD-10-CM

## 2019-04-18 DIAGNOSIS — E03.9: ICD-10-CM

## 2019-04-18 DIAGNOSIS — Z86.73: ICD-10-CM

## 2019-04-18 DIAGNOSIS — I10: ICD-10-CM
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
## 2019-04-18 DIAGNOSIS — F10.220: Primary | ICD-10-CM

## 2019-04-18 LAB
AMPHETAMINES UR QL SCN: NEGATIVE
BARBITURATES UR QL SCN: NEGATIVE
BENZODIAZ UR QL SCN: NEGATIVE
BZE UR QL SCN: NEGATIVE
CANNABINOIDS UR QL SCN: NEGATIVE
CHLORIDE SERPL-SCNC: 105 MEQ/L (ref 98–107)
ERYTHROCYTE [DISTWIDTH] IN BLOOD BY AUTOMATED COUNT: 16.3 % (ref 11.6–14.6)
ETHANOL SERPL-MCNC: 467 MG/DL
HCT VFR BLD AUTO: 39.8 % (ref 42–52)
HGB BLD-MCNC: 13.1 G/DL (ref 14–18)
LYMPHOCYTES NFR BLD MANUAL: 37 % (ref 20–50)
MCH RBC QN AUTO: 26.8 PG (ref 28–32)
MCV RBC AUTO: 81.5 FL (ref 80–94)
METHADONE UR QL SCN: NEGATIVE
MONOCYTES NFR BLD MANUAL: 3 % (ref 2–8)
NEUTS SEG NFR BLD MANUAL: 60 % (ref 45–75)
OPIATES UR QL SCN: NEGATIVE
PCP UR QL SCN: NEGATIVE
PLATELET # BLD AUTO: 199 X1000/UL (ref 130–400)
PLATELET # BLD EST: NORMAL 10*3/UL
PMV BLD AUTO: 8.1 FL (ref 7.4–10.4)
RBC # BLD AUTO: 4.88 MILL/UL (ref 4.7–6.1)

## 2019-04-18 PROCEDURE — G0480 DRUG TEST DEF 1-7 CLASSES: HCPCS

## 2019-04-18 PROCEDURE — 96375 TX/PRO/DX INJ NEW DRUG ADDON: CPT

## 2019-04-18 PROCEDURE — 80048 BASIC METABOLIC PNL TOTAL CA: CPT

## 2019-04-18 PROCEDURE — 80329 ANALGESICS NON-OPIOID 1 OR 2: CPT

## 2019-04-18 PROCEDURE — 36415 COLL VENOUS BLD VENIPUNCTURE: CPT

## 2019-04-18 PROCEDURE — 96366 THER/PROPH/DIAG IV INF ADDON: CPT

## 2019-04-18 PROCEDURE — 85025 COMPLETE CBC W/AUTO DIFF WBC: CPT

## 2019-04-18 PROCEDURE — 80305 DRUG TEST PRSMV DIR OPT OBS: CPT

## 2019-04-18 PROCEDURE — 80307 DRUG TEST PRSMV CHEM ANLYZR: CPT

## 2019-04-18 PROCEDURE — 99284 EMERGENCY DEPT VISIT MOD MDM: CPT

## 2019-04-18 PROCEDURE — 80320 DRUG SCREEN QUANTALCOHOLS: CPT

## 2019-04-18 PROCEDURE — 96365 THER/PROPH/DIAG IV INF INIT: CPT

## 2019-04-18 PROCEDURE — 70450 CT HEAD/BRAIN W/O DYE: CPT

## 2019-04-19 VITALS — DIASTOLIC BLOOD PRESSURE: 96 MMHG | SYSTOLIC BLOOD PRESSURE: 152 MMHG

## 2020-03-24 ENCOUNTER — HOSPITAL ENCOUNTER (EMERGENCY)
Dept: HOSPITAL 5 - ED | Age: 40
LOS: 1 days | Discharge: HOME | End: 2020-03-25
Payer: SELF-PAY

## 2020-03-24 DIAGNOSIS — Z98.890: ICD-10-CM

## 2020-03-24 DIAGNOSIS — R45.851: Primary | ICD-10-CM

## 2020-03-24 DIAGNOSIS — E11.9: ICD-10-CM

## 2020-03-24 DIAGNOSIS — I10: ICD-10-CM

## 2020-03-24 DIAGNOSIS — F10.129: ICD-10-CM

## 2020-03-24 LAB
ALBUMIN SERPL-MCNC: 4.1 G/DL (ref 3.9–5)
ALT SERPL-CCNC: 34 UNITS/L (ref 7–56)
BASOPHILS # (AUTO): 0.1 K/MM3 (ref 0–0.1)
BASOPHILS NFR BLD AUTO: 1 % (ref 0–1.8)
BENZODIAZEPINES SCREEN,URINE: (no result)
BILIRUB UR QL STRIP: (no result)
BLOOD UR QL VISUAL: (no result)
BUN SERPL-MCNC: 12 MG/DL (ref 9–20)
BUN/CREAT SERPL: 20 %
CALCIUM SERPL-MCNC: 8.9 MG/DL (ref 8.4–10.2)
EOSINOPHIL # BLD AUTO: 0 K/MM3 (ref 0–0.4)
EOSINOPHIL NFR BLD AUTO: 0.1 % (ref 0–4.3)
HCT VFR BLD CALC: 45.3 % (ref 35.5–45.6)
HEMOLYSIS INDEX: 19
HGB BLD-MCNC: 14.7 GM/DL (ref 11.8–15.2)
LYMPHOCYTES # BLD AUTO: 2.6 K/MM3 (ref 1.2–5.4)
LYMPHOCYTES NFR BLD AUTO: 47.3 % (ref 13.4–35)
MCHC RBC AUTO-ENTMCNC: 32 % (ref 32–34)
MCV RBC AUTO: 81 FL (ref 84–94)
METHADONE SCREEN,URINE: (no result)
MONOCYTES # (AUTO): 0.2 K/MM3 (ref 0–0.8)
MONOCYTES % (AUTO): 3.2 % (ref 0–7.3)
MUCOUS THREADS #/AREA URNS HPF: (no result) /HPF
OPIATE SCREEN,URINE: (no result)
PH UR STRIP: 6 [PH] (ref 5–7)
PLATELET # BLD: 227 K/MM3 (ref 140–440)
RBC # BLD AUTO: 5.57 M/MM3 (ref 3.65–5.03)
RBC #/AREA URNS HPF: 1 /HPF (ref 0–6)
UROBILINOGEN UR-MCNC: < 2 MG/DL (ref ?–2)
WBC #/AREA URNS HPF: 1 /HPF (ref 0–6)

## 2020-03-24 PROCEDURE — 96367 TX/PROPH/DG ADDL SEQ IV INF: CPT

## 2020-03-24 PROCEDURE — 80307 DRUG TEST PRSMV CHEM ANLYZR: CPT

## 2020-03-24 PROCEDURE — 85025 COMPLETE CBC W/AUTO DIFF WBC: CPT

## 2020-03-24 PROCEDURE — 80053 COMPREHEN METABOLIC PANEL: CPT

## 2020-03-24 PROCEDURE — 36415 COLL VENOUS BLD VENIPUNCTURE: CPT

## 2020-03-24 PROCEDURE — 81001 URINALYSIS AUTO W/SCOPE: CPT

## 2020-03-24 PROCEDURE — 99284 EMERGENCY DEPT VISIT MOD MDM: CPT

## 2020-03-24 PROCEDURE — 80320 DRUG SCREEN QUANTALCOHOLS: CPT

## 2020-03-24 PROCEDURE — 96366 THER/PROPH/DIAG IV INF ADDON: CPT

## 2020-03-24 PROCEDURE — G0480 DRUG TEST DEF 1-7 CLASSES: HCPCS

## 2020-03-24 PROCEDURE — 96365 THER/PROPH/DIAG IV INF INIT: CPT

## 2020-03-24 NOTE — EMERGENCY DEPARTMENT REPORT
HPI





- General


Chief Complaint: Alcohol


Time Seen by Provider: 03/24/20 13:02





- HPI


HPI: 





Room 2








The patient is a 40-year-old male present with a chief complaint of alcohol 

intoxication and suicidal ideation.  Per EMS the patient was found in his car 

intoxicated.  When asked what was bothering him the patient replies 

"everything."  The patient acknowledges consuming alcohol but denies any other 

drugs.  When asked if he has had thought of killing himself the patient replies 

yes.  When asked for how long the patient states "quite a bit."  The patient 

states he attempted to kill himself by drinking alcohol.  Patient denies any 

other coingestants














ED Past Medical Hx





- Past Medical History


Previous Medical History?: Yes


Hx Hypertension: Yes


Hx Diabetes: Yes





- Surgical History


Past Surgical History?: Yes


Additional Surgical History: Right foot





- Family History


Family history: no significant





- Social History


Smoking Status: Never Smoker


Substance Use Type: Alcohol





ED Review of Systems


ROS: 


Stated complaint: ETOH


Other details as noted in HPI





Psychiatric: suicidal thoughts





Physical Exam





- Physical Exam


Vital Signs: 


                                   Vital Signs











  03/24/20 03/24/20 03/24/20





  12:46 12:53 13:00


 


Temperature 98.7 F  


 


Pulse Rate 98 H 100 H 89


 


Respiratory 14 12 22





Rate   


 


Blood Pressure 120/74 120/74 118/68


 


O2 Sat by Pulse 95  





Oximetry   














  03/24/20





  13:15


 


Temperature 


 


Pulse Rate 99 H


 


Respiratory 17





Rate 


 


Blood Pressure 129/83


 


O2 Sat by Pulse 97





Oximetry 











Physical Exam: 





GENERAL: The patient is well-developed well-nourished male lying on stretcher 

appearing intoxicated. []


HEENT: Normocephalic.  Atraumatic.  Extraocular motions are intact.  Patient has

 moist mucous membranes.


NECK: Supple.  Trachea midline


CHEST/LUNGS: Clear to auscultation.  There is no respiratory distress noted.


HEART/CARDIOVASCULAR: Regular.  There is no tachycardia.  There is no gallop rub

 or murmur.


ABDOMEN: Abdomen is soft, nontender.  Patient has normal bowel sounds.  There is

 no abdominal distention.


SKIN: There is no rash.  There is no edema.  There is no diaphoresis.


NEURO: The patient is awake but intoxicated.  The patient is not cooperative 

with neurologic exam.  


MUSCULOSKELETAL:  There is no evidence of acute injury.





ED Course


                                   Vital Signs











  03/24/20 03/24/20 03/24/20





  12:46 12:53 13:00


 


Temperature 98.7 F  


 


Pulse Rate 98 H 100 H 89


 


Respiratory 14 12 22





Rate   


 


Blood Pressure 120/74 120/74 118/68


 


O2 Sat by Pulse 95  





Oximetry   














  03/24/20





  13:15


 


Temperature 


 


Pulse Rate 99 H


 


Respiratory 17





Rate 


 


Blood Pressure 129/83


 


O2 Sat by Pulse 97





Oximetry 














ED Medical Decision Making





- Lab Data


Result diagrams: 


                                 03/24/20 13:30





                                 03/24/20 13:30





- Differential Diagnosis


Suicidal ideation, alcohol intoxication


Critical care attestation.: 


If time is entered above; I have spent that time in minutes in the direct care 

of this critically ill patient, excluding procedure time.








ED Disposition


Clinical Impression: 


 Suicidal ideation





Disposition: DC/TX-[facility] PS[facility] HOSP/PSY UNIT


Is pt being admited?: No


Does the pt Need Aspirin: No


Condition: Stable


Time of Disposition: 16:32 (Awaiting acceptance)

## 2020-03-25 VITALS — DIASTOLIC BLOOD PRESSURE: 91 MMHG | SYSTOLIC BLOOD PRESSURE: 153 MMHG

## 2020-03-25 NOTE — CONSULTATION
History of Present Illness





- Reason for Consult


Consult date: 03/25/20


Reason for consult: Psych Eval





- History of Present Psychiatric Illness


Patient reports that he was intoxicated last night. Patient describes a good and

stable mood, denies being depressed or excessively nervous. Patient eats and 

sleeps well. Patient denies panic attacks, recurrent nightmares or flashbacks. 

Patient denies symptoms suggestive of OCD or PTSD. Patient denies hallucinatio

ns, paranoia, thought interference and no features suggestive of hypomania or 

joana. He completely denies suicidal or homicidal thoughts.


Recommendation:


DC 1013


Discharge home





Medications and Allergies


                                    Allergies











Allergy/AdvReac Type Severity Reaction Status Date / Time


 


No Known Allergies Allergy   Verified 03/24/20 15:02











                                Home Medications











 Medication  Instructions  Recorded  Confirmed  Last Taken  Type


 


Lisinopril [Zestril TAB] 40 mg PO QDAY 03/24/20 03/24/20 03/23/20 History


 


Metoprolol [Lopressor] 100 mg PO BID 03/24/20 03/24/20 03/23/20 History


 


hydroCHLOROthiazide 25 mg PO DAILY 03/24/20 03/24/20 03/23/20 History





[Hydrochlorothiazide]     











Active Meds: 


Active Medications





Metoprolol Tartrate (Metoprolol)  100 mg PO BID DAGO











Mental Status Exam





- Vital signs


                                Last Vital Signs











Temp  98.0 F   03/25/20 07:59


 


Pulse  74   03/25/20 07:59


 


Resp  18   03/25/20 07:59


 


BP  153/91   03/25/20 07:59


 


Pulse Ox  97   03/25/20 07:59














Results


Result Diagrams: 


                                 03/24/20 13:30





                                 03/24/20 13:30


                              Abnormal lab results











  03/24/20 03/24/20 03/24/20 Range/Units





  13:30 13:30 13:30 


 


RBC  5.57 H    (3.65-5.03)  M/mm3


 


MCV  81 L    (84-94)  fl


 


MCH  26 L    (28-32)  pg


 


RDW  15.6 H    (13.2-15.2)  %


 


Lymph % (Auto)  47.3 H    (13.4-35.0)  %


 


Potassium   3.4 L   (3.6-5.0)  mmol/L


 


Creatinine   0.6 L   (0.8-1.5)  mg/dL


 


Glucose   120 H   ()  mg/dL


 


AST   45 H   (5-40)  units/L


 


Salicylates    < 0.3 L  (2.8-20.0)  mg/dL


 


Acetaminophen     (10.0-30.0)  ug/mL


 


Plasma/Serum Alcohol     (0-0.07)  %














  03/24/20 03/24/20 03/25/20 Range/Units





  13:30 13:30 05:46 


 


RBC     (3.65-5.03)  M/mm3


 


MCV     (84-94)  fl


 


MCH     (28-32)  pg


 


RDW     (13.2-15.2)  %


 


Lymph % (Auto)     (13.4-35.0)  %


 


Potassium     (3.6-5.0)  mmol/L


 


Creatinine     (0.8-1.5)  mg/dL


 


Glucose     ()  mg/dL


 


AST     (5-40)  units/L


 


Salicylates     (2.8-20.0)  mg/dL


 


Acetaminophen  < 5.0 L    (10.0-30.0)  ug/mL


 


Plasma/Serum Alcohol   0.39 H  0.18 H  (0-0.07)  %








All other labs normal.

## 2020-03-27 ENCOUNTER — HOSPITAL ENCOUNTER (EMERGENCY)
Dept: HOSPITAL 5 - ED | Age: 40
Discharge: HOME | End: 2020-03-27
Payer: SELF-PAY

## 2020-03-27 VITALS — SYSTOLIC BLOOD PRESSURE: 137 MMHG | DIASTOLIC BLOOD PRESSURE: 74 MMHG

## 2020-03-27 DIAGNOSIS — Z87.891: ICD-10-CM
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
## 2020-03-27 DIAGNOSIS — S90.811A: Primary | ICD-10-CM

## 2020-03-27 DIAGNOSIS — E11.9: ICD-10-CM

## 2020-03-27 DIAGNOSIS — I10: ICD-10-CM

## 2020-03-27 PROCEDURE — G0480 DRUG TEST DEF 1-7 CLASSES: HCPCS

## 2020-03-27 PROCEDURE — 36415 COLL VENOUS BLD VENIPUNCTURE: CPT

## 2020-03-27 PROCEDURE — 99284 EMERGENCY DEPT VISIT MOD MDM: CPT

## 2020-03-27 PROCEDURE — 80320 DRUG SCREEN QUANTALCOHOLS: CPT

## 2020-03-27 PROCEDURE — 73590 X-RAY EXAM OF LOWER LEG: CPT

## 2020-03-27 NOTE — XRAY REPORT
RIGHT TIBIA-FIBULA 2 VIEW(S)



INDICATION / CLINICAL INFORMATION:

fall



COMPARISON:

None available.

 

FINDINGS:



BONES / JOINT(S): Lateral side plate fixation of the distal fibula with super syndesmotic screws. Ali
gnment is anatomic./Healing fracture of the proximal fibular neck. No significant arthritis.



SOFT TISSUES: Moderate soft tissue swelling of the right lower leg extending to the ankle.



ADDITIONAL FINDINGS: None.







Signer Name: TOVA Rodriguez MD 

Signed: 3/27/2020 1:34 AM

Workstation Name: [id number]

## 2020-03-27 NOTE — EMERGENCY DEPARTMENT REPORT
ED Extremity Problem HPI





- General


Chief complaint: Extremity Injury, Lower


Stated complaint: FALL,RT LEG PAIN


Time Seen by Provider: 03/27/20 04:33


Source: patient, EMS


Mode of arrival: Wheelchair


Limitations: No Limitations





- History of Present Illness


Initial comments: 





This is a 40-year-old male who has a history of alcohol intoxication presents 

the ED today complaining of right leg.  Status post slip and fall at his home 

earlier today.  Patient states that he got up and accidentally slipped and fell 

on a wooden floor at home.  Patient denies hitting his head, loss of 

consciousness.  Patient denies any nausea vomiting or diarrhea.  Patient denies 

abdominal pain, chest pain, shortness of breath or any other symptoms.


Patient does note that he has had surgery on the right ankle cold from previous 

fracture.





MD Complaint: extremity pain


Location: right





- Related Data


                                Home Medications











 Medication  Instructions  Recorded  Confirmed  Last Taken


 


Lisinopril [Zestril TAB] 40 mg PO QDAY 03/24/20 03/24/20 03/23/20


 


Metoprolol [Lopressor] 100 mg PO BID 03/24/20 03/24/20 03/23/20


 


hydroCHLOROthiazide 25 mg PO DAILY 03/24/20 03/24/20 03/23/20





[Hydrochlorothiazide]    











                                    Allergies











Allergy/AdvReac Type Severity Reaction Status Date / Time


 


No Known Allergies Allergy   Verified 03/24/20 15:02














ED Review of Systems


ROS: 


Stated complaint: FALL,RT LEG PAIN


Other details as noted in HPI





Comment: All other systems reviewed and negative





ED Past Medical Hx





- Past Medical History


Hx Hypertension: Yes


Hx Diabetes: Yes





- Surgical History


Additional Surgical History: Right foot





- Social History


Smoking Status: Former Smoker


Substance Use Type: Alcohol





- Medications


Home Medications: 


                                Home Medications











 Medication  Instructions  Recorded  Confirmed  Last Taken  Type


 


Lisinopril [Zestril TAB] 40 mg PO QDAY 03/24/20 03/24/20 03/23/20 History


 


Metoprolol [Lopressor] 100 mg PO BID 03/24/20 03/24/20 03/23/20 History


 


hydroCHLOROthiazide 25 mg PO DAILY 03/24/20 03/24/20 03/23/20 History





[Hydrochlorothiazide]     














ED Physical Exam





- General


Limitations: No Limitations


General appearance: alert, in no apparent distress





- Head


Head exam: Present: atraumatic, normocephalic





- Eye


Eye exam: Present: normal appearance





- ENT


ENT exam: Present: mucous membranes moist





- Neck


Neck exam: Present: normal inspection





- Respiratory


Respiratory exam: Present: normal lung sounds bilaterally.  Absent: respiratory 

distress





- Cardiovascular


Cardiovascular Exam: Present: regular rate, normal rhythm.  Absent: systolic 

murmur, diastolic murmur, rubs, gallop





- GI/Abdominal


GI/Abdominal exam: Present: soft, normal bowel sounds





- Rectal


Rectal exam: Present: deferred





- Extremities Exam


Extremities exam: Present: normal inspection





- Back Exam


Back exam: Present: normal inspection





- Neurological Exam


Neurological exam: Present: alert, oriented X3





- Psychiatric


Psychiatric exam: Present: normal affect, normal mood





- Skin


Skin exam: Present: warm, dry, intact, normal color.  Absent: rash





ED Course





                                   Vital Signs











  03/27/20





  00:48


 


Temperature 97.9 F


 


Pulse Rate 98 H


 


Respiratory 18





Rate 


 


Blood Pressure 162/92


 


O2 Sat by Pulse 99





Oximetry 














ED Medical Decision Making





- Medical Decision Making


40-year-old male presents with pain to the right extremity from ground-level 

slip and fall


When asked patient states that he is self-medicating with alcohol sometimes due 

to ankle pain from surgery.


X-rays were completed.  X-ray shows no acute fracture or dislocation.  I 

explained this with patient patient.  Patient is speaking in clear sentences is 

not slurred he is of his right mind.


Blood alcohol level 0.31.  Patient resuscitated with some IV fluids.











Critical care attestation.: 


If time is entered above; I have spent that time in minutes in the direct care 

of this critically ill patient, excluding procedure time.








ED Disposition


Clinical Impression: 


 Right leg pain, Myalgia, Abrasion foot/toe, Alcohol intoxication





Disposition: DC-01 TO HOME OR SELFCARE


Is pt being admited?: No


Does the pt Need Aspirin: No


Condition: Stable


Instructions:  Arthralgia (ED), Alcohol Intoxication (ED), Abuse of Alcohol (ED)


Additional Instructions: 


Make sure to follow up with the primary care physician as discussed.


Follow-up with your orthopedic doctor.


Take Tylenol as needed for your pain


If you have any worsening symptoms or develop new symptoms please return to ED 

immediately.


Referrals: 


PRIMARY CARE,MD [Primary Care Provider] - 3-5 Days


[facility] [Outside] - 3-5 Days


[facility] [Outside] - 3-5 Days


[facility] [Provider Group] - 3-5 Days


Time of Disposition: 05:48

## 2020-03-28 ENCOUNTER — HOSPITAL ENCOUNTER (EMERGENCY)
Dept: HOSPITAL 5 - ED | Age: 40
LOS: 1 days | Discharge: HOME | End: 2020-03-29
Payer: SELF-PAY

## 2020-03-28 DIAGNOSIS — E11.9: ICD-10-CM

## 2020-03-28 DIAGNOSIS — F10.129: Primary | ICD-10-CM

## 2020-03-28 DIAGNOSIS — Z98.890: ICD-10-CM

## 2020-03-28 DIAGNOSIS — Z87.891: ICD-10-CM

## 2020-03-28 DIAGNOSIS — Z79.899: ICD-10-CM

## 2020-03-28 DIAGNOSIS — I10: ICD-10-CM

## 2020-03-28 LAB
BASOPHILS # (AUTO): 0.1 K/MM3 (ref 0–0.1)
BASOPHILS NFR BLD AUTO: 1 % (ref 0–1.8)
BUN SERPL-MCNC: 7 MG/DL (ref 9–20)
BUN/CREAT SERPL: 12 %
CALCIUM SERPL-MCNC: 8.1 MG/DL (ref 8.4–10.2)
EOSINOPHIL # BLD AUTO: 0 K/MM3 (ref 0–0.4)
EOSINOPHIL NFR BLD AUTO: 0.5 % (ref 0–4.3)
HCT VFR BLD CALC: 41.9 % (ref 35.5–45.6)
HEMOLYSIS INDEX: 68
HGB BLD-MCNC: 13.7 GM/DL (ref 11.8–15.2)
LYMPHOCYTES # BLD AUTO: 2.7 K/MM3 (ref 1.2–5.4)
LYMPHOCYTES NFR BLD AUTO: 52.7 % (ref 13.4–35)
MCHC RBC AUTO-ENTMCNC: 33 % (ref 32–34)
MCV RBC AUTO: 81 FL (ref 84–94)
MONOCYTES # (AUTO): 0.2 K/MM3 (ref 0–0.8)
MONOCYTES % (AUTO): 3.4 % (ref 0–7.3)
PLATELET # BLD: 215 K/MM3 (ref 140–440)
RBC # BLD AUTO: 5.19 M/MM3 (ref 3.65–5.03)

## 2020-03-28 PROCEDURE — 80320 DRUG SCREEN QUANTALCOHOLS: CPT

## 2020-03-28 PROCEDURE — 80048 BASIC METABOLIC PNL TOTAL CA: CPT

## 2020-03-28 PROCEDURE — 99284 EMERGENCY DEPT VISIT MOD MDM: CPT

## 2020-03-28 PROCEDURE — 85025 COMPLETE CBC W/AUTO DIFF WBC: CPT

## 2020-03-28 PROCEDURE — G0480 DRUG TEST DEF 1-7 CLASSES: HCPCS

## 2020-03-28 PROCEDURE — 36415 COLL VENOUS BLD VENIPUNCTURE: CPT

## 2020-03-28 NOTE — EMERGENCY DEPARTMENT REPORT
<BRISA MCCULLOUGH - Last Filed: 03/28/20 20:38>





ED General Adult HPI





- General


Chief complaint: Altered Mental Status


Stated complaint: INTOXICATION


Time Seen by Provider: 03/28/20 19:22


Source: EMS


Mode of arrival: Stretcher


Limitations: Other





- History of Present Illness


Initial comments: 





Patient is a 40-year-old F American male who is presenting with alcohol 

intoxication.  Patient does have a history of heavy alcohol abuse.  Patient 

appears to being in capacitated due to alcohol.  Patient was unconscious and had

to have a nasal trumpet placed with subsegmental oxygen with a nonrebreather.  

Patient is awake currently with slurring of speech and does fall asleep mid 

sentence.





- Related Data


                                Home Medications











 Medication  Instructions  Recorded  Confirmed  Last Taken


 


Lisinopril [Zestril TAB] 40 mg PO QDAY 03/24/20 03/24/20 03/23/20


 


Metoprolol [Lopressor] 100 mg PO BID 03/24/20 03/24/20 03/23/20


 


hydroCHLOROthiazide 25 mg PO DAILY 03/24/20 03/24/20 03/23/20





[Hydrochlorothiazide]    











                                    Allergies











Allergy/AdvReac Type Severity Reaction Status Date / Time


 


No Known Allergies Allergy   Verified 03/24/20 15:02














ED Review of Systems


Comment: All other systems reviewed and negative





ED Past Medical Hx





- Past Medical History


Previous Medical History?: Yes


Hx Hypertension: Yes


Hx Diabetes: Yes





- Surgical History


Past Surgical History?: Yes


Additional Surgical History: Right foot





- Social History


Smoking Status: Former Smoker


Substance Use Type: Alcohol





- Medications


Home Medications: 


                                Home Medications











 Medication  Instructions  Recorded  Confirmed  Last Taken  Type


 


Lisinopril [Zestril TAB] 40 mg PO QDAY 03/24/20 03/24/20 03/23/20 History


 


Metoprolol [Lopressor] 100 mg PO BID 03/24/20 03/24/20 03/23/20 History


 


hydroCHLOROthiazide 25 mg PO DAILY 03/24/20 03/24/20 03/23/20 History





[Hydrochlorothiazide]     














ED Physical Exam





- General


Limitations: Other


General appearance: alert, in no apparent distress, appears intoxicated





- Head


Head exam: Present: atraumatic, normocephalic





- Eye


Eye exam: Present: normal appearance, PERRL, EOMI





- ENT


ENT exam: Present: mucous membranes moist





- Neck


Neck exam: Present: normal inspection





- Respiratory


Respiratory exam: Present: normal lung sounds bilaterally.  Absent: respiratory 

distress, wheezes, rales





- Cardiovascular


Cardiovascular Exam: Present: regular rate, normal rhythm, normal heart sounds. 

 Absent: systolic murmur, diastolic murmur, rubs, gallop





- GI/Abdominal


GI/Abdominal exam: Present: soft, normal bowel sounds.  Absent: distended, 

tenderness, guarding





- Rectal


Rectal exam: Present: deferred





- Extremities Exam


Extremities exam: Present: normal inspection





- Back Exam


Back exam: Present: normal inspection





- Neurological Exam


Neurological exam: Present: alert, oriented X3





- Psychiatric


Psychiatric exam: Present: normal affect, normal mood





- Skin


Skin exam: Present: warm, dry, intact, normal color.  Absent: rash





ED Course





- Reevaluation(s)


Reevaluation #1: 





03/28/20 20:36


Patient is has significant alcohol intoxication at this time.  Patient will be 

monitored until he reaches sobriety and then likely will be able to be 

discharged.


Reevaluation #2: 





03/28/20 20:38


Patient likely will not be able to be discharged from the emergency department 

without assistance until tomorrow at approximately 9 AM-12 noon on 3/29/2020








ED Medical Decision Making





- Lab Data


Result diagrams: 


                                 03/28/20 19:35





                                 03/28/20 19:35








                                   Lab Results











  03/28/20 03/28/20 03/28/20 Range/Units





  19:35 19:35 19:35 


 


WBC  5.2    (4.5-11.0)  K/mm3


 


RBC  5.19 H    (3.65-5.03)  M/mm3


 


Hgb  13.7    (11.8-15.2)  gm/dl


 


Hct  41.9    (35.5-45.6)  %


 


MCV  81 L    (84-94)  fl


 


MCH  26 L    (28-32)  pg


 


MCHC  33    (32-34)  %


 


RDW  15.7 H    (13.2-15.2)  %


 


Plt Count  215    (140-440)  K/mm3


 


Lymph % (Auto)  52.7 H    (13.4-35.0)  %


 


Mono % (Auto)  3.4    (0.0-7.3)  %


 


Eos % (Auto)  0.5    (0.0-4.3)  %


 


Baso % (Auto)  1.0    (0.0-1.8)  %


 


Lymph #  2.7    (1.2-5.4)  K/mm3


 


Mono #  0.2    (0.0-0.8)  K/mm3


 


Eos #  0.0    (0.0-0.4)  K/mm3


 


Baso #  0.1    (0.0-0.1)  K/mm3


 


Seg Neutrophils %  42.4    (40.0-70.0)  %


 


Seg Neutrophils #  2.2    (1.8-7.7)  K/mm3


 


Sodium   145   (137-145)  mmol/L


 


Potassium   3.4 L   (3.6-5.0)  mmol/L


 


Chloride   98.9   ()  mmol/L


 


Carbon Dioxide   25   (22-30)  mmol/L


 


Anion Gap   25   mmol/L


 


BUN   7 L   (9-20)  mg/dL


 


Creatinine   0.6 L   (0.8-1.5)  mg/dL


 


Estimated GFR   > 60   ml/min


 


BUN/Creatinine Ratio   12   %


 


Glucose   128 H   ()  mg/dL


 


Calcium   8.1 L   (8.4-10.2)  mg/dL


 


Plasma/Serum Alcohol    0.51 H  (0-0.07)  %














ED Disposition


Clinical Impression: 


 Alcohol intoxication





Disposition: DC-01 TO HOME OR SELFCARE


Is pt being admited?: No


Does the pt Need Aspirin: No


Condition: Stable


Instructions:  Alcohol Intoxication (ED)


Referrals: 


ARMAND AMATO MD [Staff Physician] - as needed





<ALDO MCCULLOUGH - Last Filed: 03/29/20 10:44>





ED Review of Systems


ROS: 


Stated complaint: INTOXICATION


Other details as noted in HPI








ED Course





                                   Vital Signs











  03/28/20 03/28/20 03/28/20





  19:13 19:16 19:25


 


Temperature 97.6 F  


 


Pulse Rate 103 H 93 H 


 


Respiratory 25 H 9 L 18





Rate   


 


Blood Pressure 141/96 135/77 


 


Blood Pressure   





[Right]   


 


O2 Sat by Pulse 100 100 100





Oximetry   














  03/28/20 03/28/20 03/28/20





  20:00 21:00 21:56


 


Temperature   


 


Pulse Rate 83 83 106 H


 


Respiratory 18 16 21





Rate   


 


Blood Pressure 125/76 132/84 132/84


 


Blood Pressure   





[Right]   


 


O2 Sat by Pulse 88 96 95





Oximetry   














  03/28/20 03/28/20 03/29/20





  22:00 23:00 02:00


 


Temperature   


 


Pulse Rate 96 H  101 H


 


Respiratory 16  18





Rate   


 


Blood Pressure 132/84  


 


Blood Pressure   121/65





[Right]   


 


O2 Sat by Pulse 97 97 96





Oximetry   














  03/29/20 03/29/20 03/29/20





  03:00 04:00 05:00


 


Temperature  98.2 F 


 


Pulse Rate  96 H 


 


Respiratory  18 





Rate   


 


Blood Pressure 117/61 109/62 104/56


 


Blood Pressure  109/62 





[Right]   


 


O2 Sat by Pulse 99 98 99





Oximetry   














  03/29/20 03/29/20 03/29/20





  06:00 07:00 07:25


 


Temperature   97.7 F


 


Pulse Rate   101 H


 


Respiratory   16





Rate   


 


Blood Pressure 118/73 99/64 


 


Blood Pressure   115/65





[Right]   


 


O2 Sat by Pulse 95 95 98





Oximetry   














  03/29/20 03/29/20





  08:00 09:00


 


Temperature  


 


Pulse Rate 98 H 87


 


Respiratory 15 20





Rate  


 


Blood Pressure 122/75 136/72


 


Blood Pressure  





[Right]  


 


O2 Sat by Pulse 98 98





Oximetry  














ED Medical Decision Making





- Lab Data


Result diagrams: 


                                 03/28/20 19:35





                                 03/28/20 19:35





- Medical Decision Making





Mr. Burger presented with severe alcohol intoxication.  He is now lucid awake 

articulate.  He is ambulatory without difficulty.  He was discharged home.  

Currently denies suicidal ideation.


Critical care attestation.: 


If time is entered above; I have spent that time in minutes in the direct care 

of this critically ill patient, excluding procedure time.








ED Disposition


Is pt being admited?: No


Does the pt Need Aspirin: No

## 2020-03-29 VITALS — DIASTOLIC BLOOD PRESSURE: 72 MMHG | SYSTOLIC BLOOD PRESSURE: 136 MMHG

## 2020-04-01 ENCOUNTER — HOSPITAL ENCOUNTER (EMERGENCY)
Dept: HOSPITAL 5 - ED | Age: 40
LOS: 1 days | Discharge: HOME | End: 2020-04-02
Payer: SELF-PAY

## 2020-04-01 DIAGNOSIS — F10.920: Primary | ICD-10-CM

## 2020-04-01 DIAGNOSIS — E11.9: ICD-10-CM

## 2020-04-01 DIAGNOSIS — Z79.899: ICD-10-CM

## 2020-04-01 DIAGNOSIS — M79.604: ICD-10-CM

## 2020-04-01 DIAGNOSIS — I10: ICD-10-CM

## 2020-04-01 DIAGNOSIS — Z87.891: ICD-10-CM

## 2020-04-01 DIAGNOSIS — Z98.890: ICD-10-CM

## 2020-04-01 DIAGNOSIS — F32.9: ICD-10-CM

## 2020-04-01 LAB
ALBUMIN SERPL-MCNC: (no result) G/DL (ref 3.9–5)
ALBUMIN SERPL-MCNC: 4.1 G/DL (ref 3.9–5)
ALT SERPL-CCNC: (no result) UNITS/L (ref 7–56)
ALT SERPL-CCNC: 96 UNITS/L (ref 7–56)
BASOPHILS # (AUTO): 0 K/MM3 (ref 0–0.1)
BASOPHILS NFR BLD AUTO: 0.6 % (ref 0–1.8)
BUN SERPL-MCNC: (no result) MG/DL (ref 9–20)
BUN SERPL-MCNC: 19 MG/DL (ref 9–20)
BUN/CREAT SERPL: (no result) %
BUN/CREAT SERPL: 15 %
CALCIUM SERPL-MCNC: (no result) MG/DL (ref 8.4–10.2)
CALCIUM SERPL-MCNC: 7.6 MG/DL (ref 8.4–10.2)
EOSINOPHIL # BLD AUTO: 0 K/MM3 (ref 0–0.4)
EOSINOPHIL NFR BLD AUTO: 0 % (ref 0–4.3)
HCT VFR BLD CALC: 37.8 % (ref 35.5–45.6)
HEMOLYSIS INDEX: 160
HEMOLYSIS INDEX: 61
HGB BLD-MCNC: 12.4 GM/DL (ref 11.8–15.2)
LYMPHOCYTES # BLD AUTO: 1.3 K/MM3 (ref 1.2–5.4)
LYMPHOCYTES NFR BLD AUTO: 29 % (ref 13.4–35)
MCHC RBC AUTO-ENTMCNC: 33 % (ref 32–34)
MCV RBC AUTO: 82 FL (ref 84–94)
MONOCYTES # (AUTO): 0.1 K/MM3 (ref 0–0.8)
MONOCYTES % (AUTO): 2.8 % (ref 0–7.3)
PLATELET # BLD: 120 K/MM3 (ref 140–440)
RBC # BLD AUTO: 4.62 M/MM3 (ref 3.65–5.03)

## 2020-04-01 PROCEDURE — G0480 DRUG TEST DEF 1-7 CLASSES: HCPCS

## 2020-04-01 PROCEDURE — 96375 TX/PRO/DX INJ NEW DRUG ADDON: CPT

## 2020-04-01 PROCEDURE — 80053 COMPREHEN METABOLIC PANEL: CPT

## 2020-04-01 PROCEDURE — 96376 TX/PRO/DX INJ SAME DRUG ADON: CPT

## 2020-04-01 PROCEDURE — 96374 THER/PROPH/DIAG INJ IV PUSH: CPT

## 2020-04-01 PROCEDURE — 80320 DRUG SCREEN QUANTALCOHOLS: CPT

## 2020-04-01 PROCEDURE — 85025 COMPLETE CBC W/AUTO DIFF WBC: CPT

## 2020-04-01 PROCEDURE — 80307 DRUG TEST PRSMV CHEM ANLYZR: CPT

## 2020-04-01 PROCEDURE — 36415 COLL VENOUS BLD VENIPUNCTURE: CPT

## 2020-04-01 PROCEDURE — 99285 EMERGENCY DEPT VISIT HI MDM: CPT

## 2020-04-01 PROCEDURE — 81001 URINALYSIS AUTO W/SCOPE: CPT

## 2020-04-01 NOTE — EMERGENCY DEPARTMENT REPORT
ED Psych HPI





- General


Stated Complaint: SUICIDAL


Time Seen by Provider: 04/01/20 10:10


Source: patient, EMS, old records reviewed


Mode of arrival: Stretcher


Limitations: Other (Intoxication)





- History of Present Illness


Initial Comments: 





Mr. Burger is a 40-year-old male with history of hypertension depression who 

presents with possible alcohol intoxication and suicidal ideation.  He was 

brought by EMS from his hotel room.  He called 911 for suicidal thoughts.  He 

does not have plans to harm himself.  He denies any new pain.  He states that he

drinks to numb the pain in his right leg.  He has history of previous ankle 

surgery.  He defecated en route on the EMS stretcher.





Paramedics report discovering 2 empty bottles in the hotel room.





According to electronic medical record this gentleman has had 3 previous ED 

visits within the last week.





He currently does not have a plan to harm himself.  He does not have a plan to 

harm others.


MD Complaint: suicidal ideation


-: This morning


Associated Psychiatric Symptoms: suicidal ideation


History of same: Yes


Quality: constant


Improves With: none


Worsens With: none


Context: recent alcohol abuse


Treatments Prior to Arrival: none


If Self Harm: admits thoughts of





- Related Data


                                Home Medications











 Medication  Instructions  Recorded  Confirmed  Last Taken


 


Lisinopril [Zestril TAB] 40 mg PO QDAY 03/24/20 03/24/20 03/23/20


 


Metoprolol [Lopressor] 100 mg PO BID 03/24/20 03/24/20 03/23/20


 


hydroCHLOROthiazide 25 mg PO DAILY 03/24/20 03/24/20 03/23/20





[Hydrochlorothiazide]    











                                    Allergies











Allergy/AdvReac Type Severity Reaction Status Date / Time


 


No Known Allergies Allergy   Verified 04/01/20 10:25














ED Review of Systems


ROS: 


Stated complaint: SUICIDAL


Other details as noted in HPI





Comment: All other systems reviewed and negative


Constitutional: denies: fever, malaise


Respiratory: denies: cough, shortness of breath


Cardiovascular: denies: chest pain


Psychiatric: suicidal thoughts





ED Past Medical Hx





- Past Medical History


Previous Medical History?: Yes


Hx Hypertension: Yes


Hx Diabetes: Yes





- Surgical History


Past Surgical History?: Yes


Additional Surgical History: Right foot





- Social History


Smoking Status: Former Smoker


Substance Use Type: Alcohol





- Medications


Home Medications: 


                                Home Medications











 Medication  Instructions  Recorded  Confirmed  Last Taken  Type


 


Lisinopril [Zestril TAB] 40 mg PO QDAY 03/24/20 03/24/20 03/23/20 History


 


Metoprolol [Lopressor] 100 mg PO BID 03/24/20 03/24/20 03/23/20 History


 


hydroCHLOROthiazide 25 mg PO DAILY 03/24/20 03/24/20 03/23/20 History





[Hydrochlorothiazide]     














ED Physical Exam





- General


General appearance: alert, in no apparent distress, appears intoxicated, other 

(Disheveled dirty clothing covered in feces)





- Head


Head exam: Present: atraumatic, normocephalic





- Eye


Eye exam: Present: conjunctival injection





- ENT


ENT exam: Present: mucous membranes moist





- Neck


Neck exam: Present: normal inspection, full ROM





- Respiratory


Respiratory exam: Present: normal lung sounds bilaterally.  Absent: respiratory 

distress, wheezes, rales, rhonchi





- Cardiovascular


Cardiovascular Exam: Present: regular rate, normal rhythm, normal heart sounds. 

 Absent: systolic murmur, diastolic murmur, rubs, gallop





- GI/Abdominal


GI/Abdominal exam: Present: soft, normal bowel sounds.  Absent: distended, 

tenderness, guarding, rebound





- Rectal


Rectal exam: Present: deferred





- Extremities Exam


Extremities exam: Present: pedal edema (Nonpitting edema in both extremities)





- Neurological Exam


Neurological exam: Present: alert, oriented X3





- Psychiatric


Psychiatric exam: Present: depressed, other (Labile affect)





- Skin


Skin exam: Present: warm, dry, intact, normal color.  Absent: rash





ED Course


                                   Vital Signs











  04/01/20 04/01/20 04/02/20





  11:47 20:00 01:00


 


Temperature 97.8 F 99.0 F 98.9 F


 


Pulse Rate 67 90 87


 


Respiratory 16 18 22





Rate   


 


Blood Pressure 123/67 124/85 126/69





[Left]   


 


O2 Sat by Pulse 97 98 99





Oximetry   














  04/02/20 04/02/20





  11:00 12:02


 


Temperature 98.8 F 


 


Pulse Rate 89 


 


Respiratory 20 16





Rate  


 


Blood Pressure 166/88 





[Left]  


 


O2 Sat by Pulse 95 





Oximetry  














ED Medical Decision Making





- Lab Data


Result diagrams: 


                                 04/01/20 11:29





                                 04/01/20 11:29





- Medical Decision Making





1.  Reported Suicidal ideation, patient does not currently endorse this notion. 

 When I reassessed Mr. Burger, he stated that "I want to be on the psychiatric 

board" I have requested consultation from our mental health team.





2.  Acute alcohol intoxication: Supportive measures including IV hydration car

diac monitoring pulse oximetry, blood alcohol highly elevated 0.42.  Will likely

 be discharged once sober if he does not require psychiatric intervention.  

Replete blood alcohol level ordered for 1900





Mr. Burger has been cleared by our psychiatric staff.  He is discharged home.


Critical care attestation.: 


If time is entered above; I have spent that time in minutes in the direct care 

of this critically ill patient, excluding procedure time.








ED Disposition


Clinical Impression: 


 Acute alcohol intoxication, Acute depression, Chronic pain of right lower 

extremity





Disposition: DC-01 TO HOME OR SELFCARE


Is pt being admited?: No


Does the pt Need Aspirin: No


Condition: Stable


Instructions:  Abuse of Alcohol (ED)


Referrals: 


ARMAND AMATO MD [Staff Physician] - 3-5 Days

## 2020-04-02 VITALS — SYSTOLIC BLOOD PRESSURE: 166 MMHG | DIASTOLIC BLOOD PRESSURE: 99 MMHG

## 2020-04-02 LAB
BENZODIAZEPINES SCREEN,URINE: (no result)
BILIRUB UR QL STRIP: (no result)
BLOOD UR QL VISUAL: (no result)
METHADONE SCREEN,URINE: (no result)
MUCOUS THREADS #/AREA URNS HPF: (no result) /HPF
OPIATE SCREEN,URINE: (no result)
PH UR STRIP: 6 [PH] (ref 5–7)
RBC #/AREA URNS HPF: < 1 /HPF (ref 0–6)
UROBILINOGEN UR-MCNC: < 2 MG/DL (ref ?–2)
WBC #/AREA URNS HPF: 2 /HPF (ref 0–6)

## 2020-04-02 NOTE — CONSULTATION
History of Present Illness





- Reason for Consult


Consult date: 20


Reason for consult: Suicidal


Requesting physician: ALDO MCCULLOUGH





- Chief Complaint


Chief complaint: 


I was drinking doc





- History of Present Psychiatric Illness


The patient is a 41yo single employed ([employer] ) with history of Alcohol use 

disorder. He presented to the ED with alcohol intoxication and suicidal 

thoughts. Psychiatry consulted for SI.





Per ED Physician, 





Tao is a 40-year-old male with history of hypertension depression who 

presents with possible alcohol intoxication and suicidal ideation.  He was 

brought by EMS from his hotel room.  He called 911 for suicidal thoughts.  He 

does not have plans to harm himself.  He denies any new pain.  He states that he

drinks to numb the pain in his right leg.  He has history of previous ankle 

surgery.  He defecated en route on the EMS stretcher.





Paramedics report discovering 2 empty bottles in the hotel room.





According to electronic medical record this gentleman has had 3 previous ED 

visits within the last week.





He currently does not have a plan to harm himself.  He does not have a plan to 

harm others.





In my interview with the patient this morning, he acknowledges being depressed 

and says that he is stressed about the economy. He works as an [employer]  and 

his income has dropped significantly since the Corona Virus crisis. He re

peatedly denies being suicidal. Patient denies panic attacks, recurrent 

nightmares or flashbacks. Patient denies symptoms suggestive of OCD or PTSD. 

Patient denies hallucinations, paranoia, thought interference and no features 

suggestive of hypomania or joana. He denies homicidal thoughts.





PAST PSYCHIATRIC HISTORY: 


Diagnoses: Alcohol use disorder


Suicide attempts or Self-harm behavior: Patient denies 


Prior psychiatric hospitalizations: None


Substance Abuse history: Alcohol


 Previous psychiatric medications tried: None


Outpatient treatment: No





Family Psychiatric History


None reported or documented





SOCIAL HISTORY


Marital Status: Single


Living Arrangements: In a hotel


Employment Status: [employer] 


Access to guns/weapons: Patient denies 


Education: Some college


History of Abuse: Patient denies


 Legal History: Patient denies





ROS:


Constitutional: Negative for weight loss


ENT: Negative for stridor


Respiratory: Negative for cough or hemoptysis


All other systems reviewed and are negative





MENTAL STATUS 


General Appearance and Behavior: age appropriate, good eye contact, cooperative 

with questioning and polite 


Cooperation:   Cooperative


Psychomotor Behavior:   within normal limits


Mood: depressed


Affect and affective range:   Congruent with stated mood


Thought Process:   Fluent/Logical and Goal-directed


Thought Content:   Within reality


Speech:   Normal volume and Regular rate and rhythm


Intellectual Functioning   Average


Suicidal Ideation:   Denies SI


Homicidal Ideation:   Denies HI


Impulse Control:   intact


Insight and Judgment:   normal insight and judgment


Memory:   Normal


Attention:   Normal


Orientation:   alert and oriented





RECOMMENDATIONS


MEDICATIONS: No medication adjustment recommended at this time.


PSYCHOTHERAPY: Supportive psychotherapy provided


MEDICAL: Per primary team


SAFETY SITTER: May dc


DISPOSITION: Per primary team.


Patient was offered inpatient psychiatric treatment on a Voluntary status, but 

he declined. He also declined 


medical admission for alcohol detox. He states that his lease at the hotel 

 yesterday, he is concerned 


about his property at the hotel and his car which may be towed because of where 

it is parked. He asks to be discharged 


from the hospital. 


LEGAL STATUS: 1013 discontinued as patient does not meet criteria to hold him 

against his wish. 


FOLLOW-UP: Will sign off


Thank you





Medications and Allergies


                                    Allergies











Allergy/AdvReac Type Severity Reaction Status Date / Time


 


No Known Allergies Allergy   Verified 20 10:25











                                Home Medications











 Medication  Instructions  Recorded  Confirmed  Last Taken  Type


 


Lisinopril [Zestril TAB] 40 mg PO QDAY 20 History


 


Metoprolol [Lopressor] 100 mg PO BID 20 History


 


hydroCHLOROthiazide 25 mg PO DAILY 20 History





[Hydrochlorothiazide]     











Active Meds: 


Active Medications





Chlordiazepoxide HCl (Librium)  50 mg PO Q1H PRN


   PRN Reason: CIWA-Ar 8-15


Chlordiazepoxide HCl (Librium)  100 mg PO Q1H PRN


   PRN Reason: CIWA-Ar 16-25


Lorazepam (Ativan)  2 mg IV Q1H PRN


   PRN Reason: CIWA-Ar 8-15


   Last Admin: 20 10:12 Dose:  2 mg


   Documented by: 


Lorazepam (Ativan)  4 mg IV Q1H PRN


   PRN Reason: CIWA-Ar 16-25


Lorazepam (Ativan)  4 mg IV Q15MIN PRN


   PRN Reason: CIWA-Ar >25











Mental Status Exam





- Vital signs


                                Last Vital Signs











Temp  98.8 F   20 11:00


 


Pulse  89   20 11:00


 


Resp  16   20 12:02


 


BP  166/88   20 11:00


 


Pulse Ox  95   20 11:00














Results


Result Diagrams: 


                                 20 11:29





                                 20 11:29


                              Abnormal lab results











  20 Range/Units





  11:29 18:59 


 


Chloride  90.3 L   ()  mmol/L


 


Carbon Dioxide  19 L   (22-30)  mmol/L


 


Glucose  72 L   ()  mg/dL


 


Calcium  7.6 L   (8.4-10.2)  mg/dL


 


AST  164 H   (5-40)  units/L


 


ALT  96 H   (7-56)  units/L


 


Plasma/Serum Alcohol   0.28 H  (0-0.07)  %








All other labs normal.